# Patient Record
Sex: FEMALE | Race: BLACK OR AFRICAN AMERICAN | NOT HISPANIC OR LATINO | URBAN - METROPOLITAN AREA
[De-identification: names, ages, dates, MRNs, and addresses within clinical notes are randomized per-mention and may not be internally consistent; named-entity substitution may affect disease eponyms.]

---

## 2021-03-05 ENCOUNTER — INPATIENT (INPATIENT)
Facility: HOSPITAL | Age: 86
LOS: 5 days | Discharge: NURSING, REHAB OR LONG-TERM CARE | End: 2021-03-11
Attending: FAMILY MEDICINE | Admitting: FAMILY MEDICINE
Payer: MEDICARE

## 2021-03-05 VITALS
TEMPERATURE: 98 F | HEIGHT: 67 IN | RESPIRATION RATE: 15 BRPM | HEART RATE: 106 BPM | SYSTOLIC BLOOD PRESSURE: 155 MMHG | OXYGEN SATURATION: 98 % | DIASTOLIC BLOOD PRESSURE: 95 MMHG | WEIGHT: 199.96 LBS

## 2021-03-05 LAB
ALBUMIN SERPL ELPH-MCNC: 2.6 G/DL — LOW (ref 3.3–5)
ALP SERPL-CCNC: 70 U/L — SIGNIFICANT CHANGE UP (ref 40–120)
ALT FLD-CCNC: 22 U/L — SIGNIFICANT CHANGE UP (ref 12–78)
ANION GAP SERPL CALC-SCNC: 8 MMOL/L — SIGNIFICANT CHANGE UP (ref 5–17)
ANISOCYTOSIS BLD QL: SLIGHT — SIGNIFICANT CHANGE UP
APTT BLD: 30.2 SEC — SIGNIFICANT CHANGE UP (ref 27.5–35.5)
AST SERPL-CCNC: 29 U/L — SIGNIFICANT CHANGE UP (ref 15–37)
BASOPHILS # BLD AUTO: 0.01 K/UL — SIGNIFICANT CHANGE UP (ref 0–0.2)
BASOPHILS NFR BLD AUTO: 0.1 % — SIGNIFICANT CHANGE UP (ref 0–2)
BILIRUB DIRECT SERPL-MCNC: 0.94 MG/DL — HIGH (ref 0.05–0.2)
BILIRUB SERPL-MCNC: 1.3 MG/DL — HIGH (ref 0.2–1.2)
BUN SERPL-MCNC: 30 MG/DL — HIGH (ref 7–23)
CALCIUM SERPL-MCNC: 8.9 MG/DL — SIGNIFICANT CHANGE UP (ref 8.5–10.1)
CHLORIDE SERPL-SCNC: 102 MMOL/L — SIGNIFICANT CHANGE UP (ref 96–108)
CO2 SERPL-SCNC: 30 MMOL/L — SIGNIFICANT CHANGE UP (ref 22–31)
CREAT SERPL-MCNC: 1.03 MG/DL — SIGNIFICANT CHANGE UP (ref 0.5–1.3)
D DIMER BLD IA.RAPID-MCNC: 1877 NG/ML DDU — HIGH
EOSINOPHIL # BLD AUTO: 0 K/UL — SIGNIFICANT CHANGE UP (ref 0–0.5)
EOSINOPHIL NFR BLD AUTO: 0 % — SIGNIFICANT CHANGE UP (ref 0–6)
FLUAV AG NPH QL: SIGNIFICANT CHANGE UP
FLUBV AG NPH QL: SIGNIFICANT CHANGE UP
GLUCOSE BLDC GLUCOMTR-MCNC: 138 MG/DL — HIGH (ref 70–99)
GLUCOSE SERPL-MCNC: 160 MG/DL — HIGH (ref 70–99)
HCT VFR BLD CALC: 42.8 % — SIGNIFICANT CHANGE UP (ref 34.5–45)
HGB BLD-MCNC: 13.9 G/DL — SIGNIFICANT CHANGE UP (ref 11.5–15.5)
IMM GRANULOCYTES NFR BLD AUTO: 1.3 % — SIGNIFICANT CHANGE UP (ref 0–1.5)
INR BLD: 1.25 RATIO — HIGH (ref 0.88–1.16)
LACTATE SERPL-SCNC: 1.9 MMOL/L — SIGNIFICANT CHANGE UP (ref 0.7–2)
LYMPHOCYTES # BLD AUTO: 0.67 K/UL — LOW (ref 1–3.3)
LYMPHOCYTES # BLD AUTO: 8.6 % — LOW (ref 13–44)
MANUAL SMEAR VERIFICATION: YES — SIGNIFICANT CHANGE UP
MCHC RBC-ENTMCNC: 28.1 PG — SIGNIFICANT CHANGE UP (ref 27–34)
MCHC RBC-ENTMCNC: 32.5 GM/DL — SIGNIFICANT CHANGE UP (ref 32–36)
MCV RBC AUTO: 86.5 FL — SIGNIFICANT CHANGE UP (ref 80–100)
MICROCYTES BLD QL: SLIGHT — SIGNIFICANT CHANGE UP
MONOCYTES # BLD AUTO: 1.77 K/UL — HIGH (ref 0–0.9)
MONOCYTES NFR BLD AUTO: 22.7 % — HIGH (ref 2–14)
NEUTROPHILS # BLD AUTO: 5.25 K/UL — SIGNIFICANT CHANGE UP (ref 1.8–7.4)
NEUTROPHILS NFR BLD AUTO: 67.3 % — SIGNIFICANT CHANGE UP (ref 43–77)
NRBC # BLD: 0 /100 WBCS — SIGNIFICANT CHANGE UP (ref 0–0)
OVALOCYTES BLD QL SMEAR: SLIGHT — SIGNIFICANT CHANGE UP
PLAT MORPH BLD: NORMAL — SIGNIFICANT CHANGE UP
PLATELET # BLD AUTO: 292 K/UL — SIGNIFICANT CHANGE UP (ref 150–400)
POIKILOCYTOSIS BLD QL AUTO: SLIGHT — SIGNIFICANT CHANGE UP
POTASSIUM SERPL-MCNC: 3.4 MMOL/L — LOW (ref 3.5–5.3)
POTASSIUM SERPL-SCNC: 3.4 MMOL/L — LOW (ref 3.5–5.3)
PROT SERPL-MCNC: 7.6 GM/DL — SIGNIFICANT CHANGE UP (ref 6–8.3)
PROTHROM AB SERPL-ACNC: 14.3 SEC — HIGH (ref 10.6–13.6)
RBC # BLD: 4.95 M/UL — SIGNIFICANT CHANGE UP (ref 3.8–5.2)
RBC # FLD: 16.8 % — HIGH (ref 10.3–14.5)
RBC BLD AUTO: ABNORMAL
SARS-COV-2 RNA SPEC QL NAA+PROBE: SIGNIFICANT CHANGE UP
SODIUM SERPL-SCNC: 140 MMOL/L — SIGNIFICANT CHANGE UP (ref 135–145)
TROPONIN I SERPL-MCNC: 0.05 NG/ML — HIGH (ref 0.01–0.04)
WBC # BLD: 7.8 K/UL — SIGNIFICANT CHANGE UP (ref 3.8–10.5)
WBC # FLD AUTO: 7.8 K/UL — SIGNIFICANT CHANGE UP (ref 3.8–10.5)

## 2021-03-05 PROCEDURE — 71275 CT ANGIOGRAPHY CHEST: CPT | Mod: 26,MH

## 2021-03-05 PROCEDURE — 93971 EXTREMITY STUDY: CPT | Mod: 26,RT

## 2021-03-05 PROCEDURE — 72125 CT NECK SPINE W/O DYE: CPT | Mod: 26,MH

## 2021-03-05 PROCEDURE — 71045 X-RAY EXAM CHEST 1 VIEW: CPT | Mod: 26

## 2021-03-05 PROCEDURE — 73060 X-RAY EXAM OF HUMERUS: CPT | Mod: 26,LT

## 2021-03-05 PROCEDURE — 70450 CT HEAD/BRAIN W/O DYE: CPT | Mod: 26,MH

## 2021-03-05 PROCEDURE — 93010 ELECTROCARDIOGRAM REPORT: CPT

## 2021-03-05 PROCEDURE — 73030 X-RAY EXAM OF SHOULDER: CPT | Mod: 26,LT

## 2021-03-05 PROCEDURE — 72170 X-RAY EXAM OF PELVIS: CPT | Mod: 26

## 2021-03-05 PROCEDURE — 73090 X-RAY EXAM OF FOREARM: CPT | Mod: 26,LT

## 2021-03-05 PROCEDURE — 99285 EMERGENCY DEPT VISIT HI MDM: CPT | Mod: CS

## 2021-03-05 RX ORDER — SODIUM CHLORIDE 9 MG/ML
1000 INJECTION INTRAMUSCULAR; INTRAVENOUS; SUBCUTANEOUS ONCE
Refills: 0 | Status: DISCONTINUED | OUTPATIENT
Start: 2021-03-05 | End: 2021-03-06

## 2021-03-05 RX ORDER — ASPIRIN/CALCIUM CARB/MAGNESIUM 324 MG
162 TABLET ORAL ONCE
Refills: 0 | Status: COMPLETED | OUTPATIENT
Start: 2021-03-05 | End: 2021-03-05

## 2021-03-05 RX ORDER — SODIUM CHLORIDE 9 MG/ML
500 INJECTION INTRAMUSCULAR; INTRAVENOUS; SUBCUTANEOUS ONCE
Refills: 0 | Status: COMPLETED | OUTPATIENT
Start: 2021-03-05 | End: 2021-03-05

## 2021-03-05 RX ADMIN — SODIUM CHLORIDE 500 MILLILITER(S): 9 INJECTION INTRAMUSCULAR; INTRAVENOUS; SUBCUTANEOUS at 23:51

## 2021-03-05 NOTE — ED PROVIDER NOTE - OBJECTIVE STATEMENT
89yo F hx dementia (a&ox 2 at baseline), htn, brought in by family for generalized weakness, and confusion over the past 3 days. History obtained by daughter. PAtient lives alone, family takes turns taking care of her. Reportedly slipped out of chair 3 days ago, did not hit head, no LOC. Complains of L arm pain. Has not been ambulating x for several days. Walks with cane/walker at baseline.

## 2021-03-05 NOTE — ED PROVIDER NOTE - CLINICAL SUMMARY MEDICAL DECISION MAKING FREE TEXT BOX
87 yo F presenting with generalized weakness and delirium x 3 days. r/o metabolic vs infectious cause. r/o PE, pt with RLE edema, tachy. r/o fx RUE. tba

## 2021-03-05 NOTE — ED PROVIDER NOTE - PHYSICAL EXAMINATION
VITALS: reviewed  GEN: NAD, A & O x 1  HEAD/EYES: NCAT, EOMI, anicteric sclerae  ENT: mucus membranes moist, oropharynx WNL, trachea midline  RESP: lungs CTA with equal breath sounds bilaterally, chest wall nontender and atraumatic  CV: heart with reg rhythm S1, S2, distal pulses intact and symmetric bilaterally  ABDOMEN: normoactive bowel sounds, soft, nondistended, nontender, no palpable masses  : no CVAT  MSK: tenderness with ROM LUE. extremities atraumatic and nontender, no edema, no asymmetry. the back is without midline or lateral tenderness, there is no spinal deformity or stepoff and the back is ranged painlessly. the neck has no midline tenderness, deformity, or stepoff, and is ranged painlessly.  SKIN: warm, dry, no rash, no bruising, no cyanosis. color appropriate for ethnicity  NEURO: confused, no facial asymmetry. follows commands. strength UE 5/5, sensation intact, b/l LE weakness.    PSYCH: Affect appropriate

## 2021-03-05 NOTE — ED ADULT NURSE NOTE - OBJECTIVE STATEMENT
Pt axox2 presents to the ED complaining of weakness. Pt axox2 presents to the ED complaining of weakness and confusion for the past few days. Per family, pt fell out of chair but denies head trauma. Pt has pain in lefft arm. No redness or bruxing noted. Pt usually walks with cane/walker at home.

## 2021-03-06 DIAGNOSIS — R41.82 ALTERED MENTAL STATUS, UNSPECIFIED: ICD-10-CM

## 2021-03-06 DIAGNOSIS — I10 ESSENTIAL (PRIMARY) HYPERTENSION: ICD-10-CM

## 2021-03-06 DIAGNOSIS — R15.9 FULL INCONTINENCE OF FECES: ICD-10-CM

## 2021-03-06 DIAGNOSIS — R13.10 DYSPHAGIA, UNSPECIFIED: ICD-10-CM

## 2021-03-06 DIAGNOSIS — F03.90 UNSPECIFIED DEMENTIA WITHOUT BEHAVIORAL DISTURBANCE: ICD-10-CM

## 2021-03-06 DIAGNOSIS — M25.512 PAIN IN LEFT SHOULDER: ICD-10-CM

## 2021-03-06 DIAGNOSIS — I48.91 UNSPECIFIED ATRIAL FIBRILLATION: ICD-10-CM

## 2021-03-06 DIAGNOSIS — R77.8 OTHER SPECIFIED ABNORMALITIES OF PLASMA PROTEINS: ICD-10-CM

## 2021-03-06 DIAGNOSIS — Z78.9 OTHER SPECIFIED HEALTH STATUS: Chronic | ICD-10-CM

## 2021-03-06 DIAGNOSIS — E87.6 HYPOKALEMIA: ICD-10-CM

## 2021-03-06 DIAGNOSIS — Z29.9 ENCOUNTER FOR PROPHYLACTIC MEASURES, UNSPECIFIED: ICD-10-CM

## 2021-03-06 LAB
ALBUMIN SERPL ELPH-MCNC: 2.2 G/DL — LOW (ref 3.3–5)
ALP SERPL-CCNC: 65 U/L — SIGNIFICANT CHANGE UP (ref 40–120)
ALT FLD-CCNC: 22 U/L — SIGNIFICANT CHANGE UP (ref 12–78)
ANION GAP SERPL CALC-SCNC: 13 MMOL/L — SIGNIFICANT CHANGE UP (ref 5–17)
APPEARANCE UR: ABNORMAL
AST SERPL-CCNC: 41 U/L — HIGH (ref 15–37)
BACTERIA # UR AUTO: ABNORMAL
BASOPHILS # BLD AUTO: 0.03 K/UL — SIGNIFICANT CHANGE UP (ref 0–0.2)
BASOPHILS NFR BLD AUTO: 0.3 % — SIGNIFICANT CHANGE UP (ref 0–2)
BILIRUB SERPL-MCNC: 1.1 MG/DL — SIGNIFICANT CHANGE UP (ref 0.2–1.2)
BILIRUB UR-MCNC: ABNORMAL
BUN SERPL-MCNC: 32 MG/DL — HIGH (ref 7–23)
CALCIUM SERPL-MCNC: 8.6 MG/DL — SIGNIFICANT CHANGE UP (ref 8.5–10.1)
CHLORIDE SERPL-SCNC: 109 MMOL/L — HIGH (ref 96–108)
CO2 SERPL-SCNC: 21 MMOL/L — LOW (ref 22–31)
COLOR SPEC: ABNORMAL
CREAT SERPL-MCNC: 0.95 MG/DL — SIGNIFICANT CHANGE UP (ref 0.5–1.3)
DIFF PNL FLD: ABNORMAL
EOSINOPHIL # BLD AUTO: 0 K/UL — SIGNIFICANT CHANGE UP (ref 0–0.5)
EOSINOPHIL NFR BLD AUTO: 0 % — SIGNIFICANT CHANGE UP (ref 0–6)
EPI CELLS # UR: SIGNIFICANT CHANGE UP
GLUCOSE SERPL-MCNC: 111 MG/DL — HIGH (ref 70–99)
GLUCOSE UR QL: NEGATIVE MG/DL — SIGNIFICANT CHANGE UP
GRAN CASTS # UR COMP ASSIST: ABNORMAL /LPF
HCT VFR BLD CALC: 43.4 % — SIGNIFICANT CHANGE UP (ref 34.5–45)
HGB BLD-MCNC: 13.6 G/DL — SIGNIFICANT CHANGE UP (ref 11.5–15.5)
IMM GRANULOCYTES NFR BLD AUTO: 1 % — SIGNIFICANT CHANGE UP (ref 0–1.5)
KETONES UR-MCNC: ABNORMAL
LEUKOCYTE ESTERASE UR-ACNC: ABNORMAL
LYMPHOCYTES # BLD AUTO: 1.17 K/UL — SIGNIFICANT CHANGE UP (ref 1–3.3)
LYMPHOCYTES # BLD AUTO: 13.1 % — SIGNIFICANT CHANGE UP (ref 13–44)
MAGNESIUM SERPL-MCNC: 1.8 MG/DL — SIGNIFICANT CHANGE UP (ref 1.6–2.6)
MCHC RBC-ENTMCNC: 27.8 PG — SIGNIFICANT CHANGE UP (ref 27–34)
MCHC RBC-ENTMCNC: 31.3 GM/DL — LOW (ref 32–36)
MCV RBC AUTO: 88.8 FL — SIGNIFICANT CHANGE UP (ref 80–100)
MONOCYTES # BLD AUTO: 2.31 K/UL — HIGH (ref 0–0.9)
MONOCYTES NFR BLD AUTO: 25.8 % — HIGH (ref 2–14)
NEUTROPHILS # BLD AUTO: 5.34 K/UL — SIGNIFICANT CHANGE UP (ref 1.8–7.4)
NEUTROPHILS NFR BLD AUTO: 59.8 % — SIGNIFICANT CHANGE UP (ref 43–77)
NITRITE UR-MCNC: NEGATIVE — SIGNIFICANT CHANGE UP
NRBC # BLD: 0 /100 WBCS — SIGNIFICANT CHANGE UP (ref 0–0)
NT-PROBNP SERPL-SCNC: 1020 PG/ML — HIGH (ref 0–450)
PH UR: 6.5 — SIGNIFICANT CHANGE UP (ref 5–8)
PHOSPHATE SERPL-MCNC: 3.4 MG/DL — SIGNIFICANT CHANGE UP (ref 2.5–4.5)
PLATELET # BLD AUTO: 246 K/UL — SIGNIFICANT CHANGE UP (ref 150–400)
POTASSIUM SERPL-MCNC: 4.1 MMOL/L — SIGNIFICANT CHANGE UP (ref 3.5–5.3)
POTASSIUM SERPL-SCNC: 4.1 MMOL/L — SIGNIFICANT CHANGE UP (ref 3.5–5.3)
PROT SERPL-MCNC: 7.3 GM/DL — SIGNIFICANT CHANGE UP (ref 6–8.3)
PROT UR-MCNC: 100 MG/DL
RBC # BLD: 4.89 M/UL — SIGNIFICANT CHANGE UP (ref 3.8–5.2)
RBC # FLD: 17.1 % — HIGH (ref 10.3–14.5)
RBC CASTS # UR COMP ASSIST: ABNORMAL /HPF (ref 0–4)
SODIUM SERPL-SCNC: 143 MMOL/L — SIGNIFICANT CHANGE UP (ref 135–145)
SP GR SPEC: 1.01 — SIGNIFICANT CHANGE UP (ref 1.01–1.02)
TROPONIN I SERPL-MCNC: 0.04 NG/ML — SIGNIFICANT CHANGE UP (ref 0.01–0.04)
TSH SERPL-MCNC: 4.96 UU/ML — HIGH (ref 0.36–3.74)
UROBILINOGEN FLD QL: 12 MG/DL
WBC # BLD: 8.94 K/UL — SIGNIFICANT CHANGE UP (ref 3.8–10.5)
WBC # FLD AUTO: 8.94 K/UL — SIGNIFICANT CHANGE UP (ref 3.8–10.5)
WBC UR QL: SIGNIFICANT CHANGE UP

## 2021-03-06 PROCEDURE — 99223 1ST HOSP IP/OBS HIGH 75: CPT

## 2021-03-06 PROCEDURE — 72131 CT LUMBAR SPINE W/O DYE: CPT | Mod: 26

## 2021-03-06 RX ORDER — APIXABAN 2.5 MG/1
5 TABLET, FILM COATED ORAL
Refills: 0 | Status: DISCONTINUED | OUTPATIENT
Start: 2021-03-06 | End: 2021-03-06

## 2021-03-06 RX ORDER — TRAMADOL HYDROCHLORIDE 50 MG/1
50 TABLET ORAL EVERY 6 HOURS
Refills: 0 | Status: DISCONTINUED | OUTPATIENT
Start: 2021-03-06 | End: 2021-03-11

## 2021-03-06 RX ORDER — ONDANSETRON 8 MG/1
4 TABLET, FILM COATED ORAL EVERY 6 HOURS
Refills: 0 | Status: DISCONTINUED | OUTPATIENT
Start: 2021-03-06 | End: 2021-03-11

## 2021-03-06 RX ORDER — DONEPEZIL HYDROCHLORIDE 10 MG/1
10 TABLET, FILM COATED ORAL AT BEDTIME
Refills: 0 | Status: DISCONTINUED | OUTPATIENT
Start: 2021-03-06 | End: 2021-03-08

## 2021-03-06 RX ORDER — ENOXAPARIN SODIUM 100 MG/ML
60 INJECTION SUBCUTANEOUS
Refills: 0 | Status: DISCONTINUED | OUTPATIENT
Start: 2021-03-06 | End: 2021-03-09

## 2021-03-06 RX ORDER — SODIUM CHLORIDE 9 MG/ML
1000 INJECTION INTRAMUSCULAR; INTRAVENOUS; SUBCUTANEOUS
Refills: 0 | Status: DISCONTINUED | OUTPATIENT
Start: 2021-03-06 | End: 2021-03-08

## 2021-03-06 RX ORDER — SODIUM CHLORIDE 9 MG/ML
1000 INJECTION INTRAMUSCULAR; INTRAVENOUS; SUBCUTANEOUS
Refills: 0 | Status: DISCONTINUED | OUTPATIENT
Start: 2021-03-06 | End: 2021-03-06

## 2021-03-06 RX ORDER — KETOROLAC TROMETHAMINE 30 MG/ML
15 SYRINGE (ML) INJECTION EVERY 8 HOURS
Refills: 0 | Status: DISCONTINUED | OUTPATIENT
Start: 2021-03-06 | End: 2021-03-11

## 2021-03-06 RX ORDER — LIDOCAINE 4 G/100G
1 CREAM TOPICAL EVERY 24 HOURS
Refills: 0 | Status: DISCONTINUED | OUTPATIENT
Start: 2021-03-06 | End: 2021-03-11

## 2021-03-06 RX ORDER — ACETAMINOPHEN 500 MG
650 TABLET ORAL EVERY 6 HOURS
Refills: 0 | Status: DISCONTINUED | OUTPATIENT
Start: 2021-03-06 | End: 2021-03-11

## 2021-03-06 RX ORDER — LOSARTAN POTASSIUM 100 MG/1
100 TABLET, FILM COATED ORAL DAILY
Refills: 0 | Status: DISCONTINUED | OUTPATIENT
Start: 2021-03-06 | End: 2021-03-11

## 2021-03-06 RX ORDER — METOPROLOL TARTRATE 50 MG
12.5 TABLET ORAL
Refills: 0 | Status: DISCONTINUED | OUTPATIENT
Start: 2021-03-06 | End: 2021-03-08

## 2021-03-06 RX ORDER — ENOXAPARIN SODIUM 100 MG/ML
65 INJECTION SUBCUTANEOUS
Refills: 0 | Status: DISCONTINUED | OUTPATIENT
Start: 2021-03-06 | End: 2021-03-06

## 2021-03-06 RX ORDER — POTASSIUM CHLORIDE 20 MEQ
10 PACKET (EA) ORAL ONCE
Refills: 0 | Status: COMPLETED | OUTPATIENT
Start: 2021-03-06 | End: 2021-03-06

## 2021-03-06 RX ADMIN — ENOXAPARIN SODIUM 60 MILLIGRAM(S): 100 INJECTION SUBCUTANEOUS at 17:10

## 2021-03-06 RX ADMIN — TRAMADOL HYDROCHLORIDE 50 MILLIGRAM(S): 50 TABLET ORAL at 23:26

## 2021-03-06 RX ADMIN — SODIUM CHLORIDE 75 MILLILITER(S): 9 INJECTION INTRAMUSCULAR; INTRAVENOUS; SUBCUTANEOUS at 05:07

## 2021-03-06 RX ADMIN — LIDOCAINE 1 PATCH: 4 CREAM TOPICAL at 05:43

## 2021-03-06 RX ADMIN — LIDOCAINE 1 PATCH: 4 CREAM TOPICAL at 18:03

## 2021-03-06 RX ADMIN — Medication 12.5 MILLIGRAM(S): at 18:43

## 2021-03-06 RX ADMIN — Medication 15 MILLIGRAM(S): at 05:40

## 2021-03-06 RX ADMIN — SODIUM CHLORIDE 75 MILLILITER(S): 9 INJECTION INTRAMUSCULAR; INTRAVENOUS; SUBCUTANEOUS at 17:09

## 2021-03-06 RX ADMIN — ONDANSETRON 4 MILLIGRAM(S): 8 TABLET, FILM COATED ORAL at 02:27

## 2021-03-06 RX ADMIN — Medication 15 MILLIGRAM(S): at 02:27

## 2021-03-06 RX ADMIN — LIDOCAINE 1 PATCH: 4 CREAM TOPICAL at 07:42

## 2021-03-06 RX ADMIN — Medication 100 MILLIEQUIVALENT(S): at 05:07

## 2021-03-06 RX ADMIN — DONEPEZIL HYDROCHLORIDE 10 MILLIGRAM(S): 10 TABLET, FILM COATED ORAL at 21:10

## 2021-03-06 NOTE — H&P ADULT - PROBLEM SELECTOR PLAN 10
- on Reta Ibarra w/ daughter Kim whom pt lives w/ and is also HCP, pt DNR/DNI. Also spoke w/ Ms Farley also daughter who lives in [place]

## 2021-03-06 NOTE — H&P ADULT - PROBLEM SELECTOR PLAN 6
- also of urine, new per daughter, unsure if due to weakness  - Neuro exam limited  - f/u CT L spine

## 2021-03-06 NOTE — H&P ADULT - PROBLEM SELECTOR PLAN 9
- on Eliquis - on Reta Ibarra w/ daughter Kim whom pt lives w/ and is also HCP, pt DNR/DNI. Also spoke w/ Ms Farley also daughter who lives in [place] - c/w [place]--> Losartan

## 2021-03-06 NOTE — CONSULT NOTE ADULT - SUBJECTIVE AND OBJECTIVE BOX
Patient is a 88yFemale demented who presents to [facility] ED w/ a c/o of L Shoulder pain s/p MF 4 days ago. Unable to obtain any significant hx from pt 2/2 baseline mental status. Per ED pt lives with daughter who reports she had a MF 4 days ago and is now complaining of L shoulder pain. Denies HS/LOC. Localizing pain to left shoulder, denies radiation of pain elsewhere. Denies any numbness or tingling. Denies having any other pain elsewhere. Denies fevers/chills. Afebrile in ED. Ambulates w/ assistive devices at baseline & was able to ambulate after the injury. No other orthopaedic concerns at this time.    PAST MEDICAL & SURGICAL HISTORY:  Atrial fibrillation  Dementia  HTN (hypertension)  Surgical history unknown    No Known Allergies    PHYSICAL EXAM:  T(C): 36.4 (03-05-21 @ 20:57), Max: 36.4 (03-05-21 @ 20:57)  HR: 96 (03-06-21 @ 02:32) (84 - 106)  BP: 155/94 (03-06-21 @ 02:32) (155/94 - 157/100)  RR: 26 (03-06-21 @ 02:32) (15 - 26)  SpO2: 93% (03-06-21 @ 02:32) (93% - 98%)    Gen: NAD, Resting comfortably, a/ox0, not following commands, responds verbal/noxious stimuli  Left Upper Extremity:   Skin intact, + soft tissue swelling/mass anterior deltoid, no palpable effusion, no erythema, ecchymosis, edema, or gross deformity.  NTTP over the bony prominences of the shoulder/elbow/wrist/hand/fingers  Painless passive ROM of the shoulder/elbow/wrist/hand/fingers  Unable to assess motor/sensory exam 2/2 baseline mental status  + radial pulse  Compartments soft and compressible    Secondary Survey:   Negative log roll/heelstrike BL. No TTP over bony prominences, SILT, palpable pulses, full/painless PROM, compartments soft. No TTP over spinous processes or paraspinal muscles at C/T/L spine. No palpable step off. No other injuries or complaints.      Imaging:  CT Chest demonstrates BL glenohumeral joint congruency, + GH arthritic changes BL, + Soft tissue swelling L Anterior deltoid, no acute fx/dsx appreciated  XR L Shldr/Humerus/Forearm demonstrates no obvious fx/dsxn, elbow eval limited 2/2 poor quality films however +Elbow arthritic changes & no obvious fx/dsx             Patient is a 88yFemale demented who presents to [facility] ED w/ a c/o of L Shoulder pain s/p MF 4 days ago. Unable to obtain any significant hx from pt 2/2 baseline mental status. Per ED pt lives with daughter who reports she had a MF 4 days ago and is now complaining of L shoulder pain. Denies HS/LOC. Localizing pain to left shoulder, denies radiation of pain elsewhere. Denies any numbness or tingling. Denies having any other pain elsewhere. Denies fevers/chills. Afebrile in ED. Ambulates w/ assistive devices at baseline & was able to ambulate after the injury. No other orthopaedic concerns at this time.    PAST MEDICAL & SURGICAL HISTORY:  Atrial fibrillation  Dementia  HTN (hypertension)  Surgical history unknown    No Known Allergies    PHYSICAL EXAM:  T(C): 36.4 (03-05-21 @ 20:57), Max: 36.4 (03-05-21 @ 20:57)  HR: 96 (03-06-21 @ 02:32) (84 - 106)  BP: 155/94 (03-06-21 @ 02:32) (155/94 - 157/100)  RR: 26 (03-06-21 @ 02:32) (15 - 26)  SpO2: 93% (03-06-21 @ 02:32) (93% - 98%)    Gen: NAD, Resting comfortably, a/ox0, not following commands, responds verbal/noxious stimuli, groans/moans intermittently during exam however not reproducible with any specific extremity/movement/palpation  Left Upper Extremity:   Skin intact, + soft tissue swelling/mass anterior deltoid, no palpable effusion, no erythema, ecchymosis, edema, or gross deformity.  NTTP over the bony prominences of the shoulder/elbow/wrist/hand/fingers  Painless passive ROM of the shoulder/elbow/wrist/hand/fingers  Unable to assess motor/sensory exam 2/2 baseline mental status  + radial pulse  Compartments soft and compressible    Secondary Survey:   Negative log roll/heelstrike BL. No TTP over bony prominences, SILT, palpable pulses, full/painless PROM, compartments soft. No TTP over spinous processes or paraspinal muscles at C/T/L spine. No palpable step off. No other injuries or complaints.      Imaging:  CT Chest demonstrates BL glenohumeral joint congruency, + GH arthritic changes BL, + Soft tissue swelling L Anterior deltoid, no acute fx/dsx appreciated  XR L Shldr/Humerus/Forearm demonstrates no obvious fx/dsxn, elbow eval limited 2/2 poor quality films however +Elbow arthritic changes & no obvious fx/dsx             Patient is a 88yFemale demented who presents to [facility] ED w/ a c/o of L Shoulder pain s/p MF 4 days ago. Unable to obtain any significant hx from pt 2/2 baseline mental status. Per ED pt lives with daughter who reports she had a MF 4 days ago and is now complaining of L shoulder pain. Denies HS/LOC. Localizing pain to left shoulder, denies radiation of pain elsewhere. Denies any numbness or tingling. Denies having any other pain elsewhere. Denies fevers/chills. Afebrile in ED. Ambulates w/ assistive devices at baseline & was able to ambulate after the injury. No other orthopaedic concerns at this time.    PAST MEDICAL & SURGICAL HISTORY:  Atrial fibrillation  Dementia  HTN (hypertension)  Surgical history unknown    No Known Allergies    PHYSICAL EXAM:  T(C): 36.4 (03-05-21 @ 20:57), Max: 36.4 (03-05-21 @ 20:57)  HR: 96 (03-06-21 @ 02:32) (84 - 106)  BP: 155/94 (03-06-21 @ 02:32) (155/94 - 157/100)  RR: 26 (03-06-21 @ 02:32) (15 - 26)  SpO2: 93% (03-06-21 @ 02:32) (93% - 98%)    Gen: NAD, Resting comfortably, a/ox0, not following commands, responds verbal/noxious stimuli, groans/moans intermittently during exam however not reproducible with any specific extremity/movement/palpation  Left Upper Extremity:   Skin intact, + soft tissue swelling/mass anterior deltoid, no palpable joint effusion, no erythema, ecchymosis, edema, or gross deformity.  NTTP over the bony prominences of the shoulder/elbow/wrist/hand/fingers  Painless passive ROM of the shoulder/elbow/wrist/hand/fingers  Unable to assess motor/sensory exam 2/2 baseline mental status  + radial pulse  Compartments soft and compressible    Secondary Survey:   Negative log roll/heelstrike BL. No TTP over bony prominences, SILT, palpable pulses, full/painless PROM, compartments soft. No TTP over spinous processes or paraspinal muscles at C/T/L spine. No palpable step off. No other injuries or complaints.      Imaging:  CT Chest demonstrates BL glenohumeral joint congruency, + GH arthritic changes BL, + Soft tissue swelling L Anterior deltoid, no acute fx/dsx appreciated  XR L Shldr/Humerus/Forearm demonstrates no obvious fx/dsxn, elbow eval limited 2/2 poor quality films however +Elbow arthritic changes & no obvious fx/dsx

## 2021-03-06 NOTE — H&P ADULT - PROBLEM SELECTOR PLAN 3
- ? dislocation  - per Ortho does not appear dislocated on Xray, possible old hematoma, /fu official consult  - lidoderm patch for pain - ? dislocation  - per Ortho does not appear dislocated on Xray, possible old hematoma, f/u official consult  - lidoderm patch for pain

## 2021-03-06 NOTE — H&P ADULT - NSHPPHYSICALEXAM_GEN_ALL_CORE
PHYSICAL EXAM:    Vital Signs Last 24 Hrs  T(C): 36.4 (05 Mar 2021 20:57), Max: 36.4 (05 Mar 2021 20:57)  T(F): 97.5 (05 Mar 2021 20:57), Max: 97.5 (05 Mar 2021 20:57)  HR: 84 (06 Mar 2021 00:16) (84 - 106)  BP: 157/100 (06 Mar 2021 00:16) (155/95 - 157/100)  BP(mean): --  RR: 24 (06 Mar 2021 00:16) (15 - 24)  SpO2: 98% (06 Mar 2021 00:16) (98% - 98%)    GENERAL: Pt lying in bed comfortably in NAD  HEENT:  Atraumatic, EOMI, PERRL, conjunctiva and sclera clear, MMM  NECK: Supple, No JVD  CHEST/LUNG: Clear to auscultation bilaterally; No rales, rhonchi, wheezing or rubs. Unlabored respirations  HEART: Regular rate and rhythm; No murmurs, rubs, or gallops  ABDOMEN: Bowel sounds present; Soft, Nontender, Nondistended. No guarding or rigidity    EXTREMITIES:  2+ Peripheral Pulses, brisk capillary refill. No clubbing, cyanosis, or edema  NEUROLOGICAL:  Alert & Oriented X3, speech clear. Answers questions appropriately. Full and equal strength B/L upper and lower extremities. No deficits   MSK: FROM x 4 extremities   SKIN: No rashes or lesions PHYSICAL EXAM:    Vital Signs Last 24 Hrs  T(C): 36.4 (05 Mar 2021 20:57), Max: 36.4 (05 Mar 2021 20:57)  T(F): 97.5 (05 Mar 2021 20:57), Max: 97.5 (05 Mar 2021 20:57)  HR: 84 (06 Mar 2021 00:16) (84 - 106)  BP: 157/100 (06 Mar 2021 00:16) (155/95 - 157/100)  BP(mean): --  RR: 24 (06 Mar 2021 00:16) (15 - 24)  SpO2: 98% (06 Mar 2021 00:16) (98% - 98%)    GENERAL: Pt lying in bed comfortably in NAD  HEENT:  Atraumatic, PERRL DRY MM, poor dentition  NECK: Supple,  CHEST/LUNG: Clear to auscultation bilaterally; No rales, rhonchi, wheezing or rubs. Unlabored respirations  HEART: Irregular rate and rhythm  ABDOMEN: Bowel sounds present; Soft, Nontender, Nondistended    EXTREMITIES:  2+ Peripheral Pulses, brisk capillary refill. +b/l LE edema, left shoulder bulge ? dislocation  NEUROLOGICAL:  lethargic, moans, withdraws to pain  SKIN: No rashes or lesions

## 2021-03-06 NOTE — CONSULT NOTE ADULT - ASSESSMENT
A/P:  Patient is a 88y Female w/ L Shoulder OA, no fractures/dislocations    -ED Admitting to medicine for AMS  -Medical management per primary team  -Multimodal Analgesia prn  -WBAT LUE  -DVT ppx per primary team  -PT/OT  -Ice and elevate as tolerated  -No acute orthopaedic surgical intervention indicated at this time  -Orthopaedically stable  -Recommend follow up w/ Dr. Fagan outpatient in 1-2 weeks. Call office to schedule appointment.  -All Patient's and Family Member's questions answered. Patient/family understand and agree w/ plan.  -Will discuss w/ attending and advise if plan changes.    Angel Barros M.D.   Orthopaedic Surgery A/P:  Patient is a 88y Female w/ L Shoulder chronic OA, swelling/mass about the anterior deltoid may represent soft tissue contusion/swelling vs hematoma in setting of recent trauma. No Acute fractures/dislocations appreciated. Pt afebrile, WBC WNL, low clinical suspicion for septic arthritis at this time.     -ED Admitting to medicine for AMS  -Medical management per primary team  -Multimodal Analgesia prn  -WBAT LUE  -DVT ppx per primary team  -PT/OT  -Ice and elevate as tolerated  -No acute orthopaedic surgical intervention indicated at this time  -Orthopaedically stable  -Recommend follow up w/ Dr. Fagan outpatient in 1-2 weeks. Call office to schedule appointment.  -All Patient's and Family Member's questions answered. Patient/family understand and agree w/ plan.  -Will discuss w/ attending and advise if plan changes.    Angel Barros M.D.   Orthopaedic Surgery

## 2021-03-06 NOTE — H&P ADULT - PROBLEM SELECTOR PLAN 2
- Pt in mild RVR on arrival  - start low dose BB  - start Eliquis  - monitor on tele  - f/u TSH  - 2D Echo

## 2021-03-06 NOTE — H&P ADULT - HISTORY OF PRESENT ILLNESS
89 y/o female w/ PMHx of HTN, recently diagnosed w/ Dementia and Atrial Fibrillation by PCP 3 weeks ago presents to the ED w/ worsening AMS. Pt minimally verbal, moans at times, lethargic, hx obtained from daughter Kim. Daughter reports in the last 3-4 days pt has been "delusional", seeing things that are not there, tangential during conversation, less talkative, staying in bed, +ve urinary and bowel incontinence. Daughter reports 3 days ago pt slid off the chair to her buttock, no head trauma or LOC, no other trauma to other joints. Today daughter noted pt c/o left shoulder and arm pain, daughter denies any trauma to shoulder (pt watched almost ATC between her and her son). Pt also c/o back pain however daughter states that is chronic for years, unchanged from baseline as far as she known.    89 y/o female w/ PMHx of HTN, recently diagnosed w/ Dementia and Atrial Fibrillation by PCP 3 weeks ago presents to the ED w/ worsening AMS. Pt minimally verbal, moans at times, lethargic, hx obtained from daughter Kim. Daughter reports in the last 3-4 days pt has been "delusional", seeing things that are not there, tangential during conversation, less talkative, staying in bed, NO PO intake, +ve urinary and bowel incontinence. Daughter reports 3 days ago pt slid off the chair to her buttock, no head trauma or LOC, no other trauma to other joints. Today daughter noted pt c/o left shoulder and arm pain, daughter denies any trauma to shoulder (pt watched almost ATC between her and her son). Pt also c/o back pain however daughter states that is chronic for years, unchanged from baseline as far as she knows. Per daughter no other complaints; no cp, fever or chills, n/v, abd pain or diarrhea.    In ED initial vitals /95, , rest of vitals stable. Labs sig for Dimer 1877, K 3.4, Trop 0.053, BNP 1020, CT head, C spine, CT A chest LE doppler, neg for acute pathology     87 y/o female w/ PMHx of HTN, recently diagnosed w/ Dementia (started on Aricept, referred to Neuro) and Atrial Fibrillation by PCP 3 weeks ago (not started on any medications, referred to Cardio) presents to the ED w/ worsening AMS. Pt minimally verbal, moans at times, lethargic, hx obtained from daughter Kim. Daughter reports in the last 3-4 days pt has been "delusional", seeing things that are not there, tangential during conversation, less talkative, staying in bed, NO PO intake, +ve urinary and bowel incontinence. Daughter reports 3 days ago pt slid off the chair to her buttock, no head trauma or LOC, no other trauma to other joints. Today daughter noted pt c/o left shoulder and arm pain, daughter denies any trauma to shoulder (pt watched almost ATC between her and her son). Pt also c/o back pain however daughter states that is chronic for years, unchanged from baseline as far as she knows. Per daughter no other complaints; no cp, fever or chills, n/v, abd pain or diarrhea.    In ED initial vitals /95, , rest of vitals stable. Labs sig for Dimer 1877, K 3.4, Trop 0.053, BNP 1020, CT head, C spine, CT A chest LE doppler, neg for acute pathology

## 2021-03-06 NOTE — H&P ADULT - ASSESSMENT
89 y/o female w/ PMHx of HTN, recently diagnosed w/ Dementia and Atrial Fibrillation by PCP 3 weeks ago presents to the ED w/ worsening AMS. Pt being admitted for     IMPROVE VTE Individual Risk Assessment    RISK                                                                Points    [  ] Previous VTE                                                  3    [  ] Thrombophilia                                               2    [  ] Lower limb paralysis                                      2        (unable to hold up >15 seconds)      [  ] Current Cancer                                              2         (within 6 months)    [  ] Immobilization > 24 hrs                                1    [  ] ICU/CCU stay > 24 hours                              1    [  ] Age > 60                                                      1    IMPROVE VTE Score _________    IMPROVE Score 0-1: Low Risk, No VTE prophylaxis required for most patients, encourage ambulation.   IMPROVE Score 2-3: At risk, pharmacologic VTE prophylaxis is indicated for most patients (in the absence of a contraindication)  IMPROVE Score > or = 4: High Risk, pharmacologic VTE prophylaxis is indicated for most patients (in the absence of a contraindication) 87 y/o female w/ PMHx of HTN, recently diagnosed w/ Dementia and Atrial Fibrillation by PCP 3 weeks ago presents to the ED w/ worsening AMS. Pt being admitted for further work up.     IMPROVE VTE Individual Risk Assessment    RISK                                                                Points    [  ] Previous VTE                                                  3    [  ] Thrombophilia                                               2    [  ] Lower limb paralysis                                      2        (unable to hold up >15 seconds)      [  ] Current Cancer                                              2         (within 6 months)    [  ] Immobilization > 24 hrs                                1    [  ] ICU/CCU stay > 24 hours                              1    [  ] Age > 60                                                      1    IMPROVE VTE Score _________    IMPROVE Score 0-1: Low Risk, No VTE prophylaxis required for most patients, encourage ambulation.   IMPROVE Score 2-3: At risk, pharmacologic VTE prophylaxis is indicated for most patients (in the absence of a contraindication)  IMPROVE Score > or = 4: High Risk, pharmacologic VTE prophylaxis is indicated for most patients (in the absence of a contraindication)

## 2021-03-06 NOTE — H&P ADULT - PROBLEM SELECTOR PLAN 1
- unclear etiology  - CT head neg  - CT chest neg  - COVID neg  - UA pending; pt initial straight cath, no urine in bladder, likely due to dehydration ornelas placed, c/w IVF, f/u UA

## 2021-03-06 NOTE — CHART NOTE - NSCHARTNOTEFT_GEN_A_CORE
seen and examined  c/w current treatment, urine output low this am, c/w ivf and monitor    Vital Signs Last 24 Hrs  T(C): 36.4 (06 Mar 2021 03:43), Max: 36.4 (05 Mar 2021 20:57)  T(F): 97.6 (06 Mar 2021 03:43), Max: 97.6 (06 Mar 2021 03:43)  HR: 94 (06 Mar 2021 03:43) (84 - 106)  BP: 126/90 (06 Mar 2021 03:43) (126/90 - 157/100)  BP(mean): --  RR: 20 (06 Mar 2021 03:43) (15 - 26)  SpO2: 94% (06 Mar 2021 03:43) (93% - 98%)                            13.6   8.94  )-----------( 246      ( 06 Mar 2021 08:44 )             43.4     03-06    143  |  109<H>  |  32<H>  ----------------------------<  111<H>  4.1   |  21<L>  |  0.95    Ca    8.6      06 Mar 2021 08:44  Phos  3.4     03-06  Mg     1.8     03-06    TPro  7.3  /  Alb  2.2<L>  /  TBili  1.1  /  DBili  x   /  AST  41<H>  /  ALT  22  /  AlkPhos  65  03-06    PT/INR - ( 05 Mar 2021 21:33 )   PT: 14.3 sec;   INR: 1.25 ratio         PTT - ( 05 Mar 2021 21:33 )  PTT:30.2 sec

## 2021-03-06 NOTE — H&P ADULT - NSHPLABSRESULTS_GEN_ALL_CORE
T(C): 36.4 (03-05-21 @ 20:57), Max: 36.4 (03-05-21 @ 20:57)  HR: 84 (03-06-21 @ 00:16) (84 - 106)  BP: 157/100 (03-06-21 @ 00:16) (155/95 - 157/100)  RR: 24 (03-06-21 @ 00:16) (15 - 24)  SpO2: 98% (03-06-21 @ 00:16) (98% - 98%)                        13.9   7.80  )-----------( 292      ( 05 Mar 2021 21:33 )             42.8     03-05    140  |  102  |  30<H>  ----------------------------<  160<H>  3.4<L>   |  30  |  1.03    Ca    8.9      05 Mar 2021 21:33    TPro  7.6  /  Alb  2.6<L>  /  TBili  1.3<H>  /  DBili  .94<H>  /  AST  29  /  ALT  22  /  AlkPhos  70  03-05    LIVER FUNCTIONS - ( 05 Mar 2021 21:33 )  Alb: 2.6 g/dL / Pro: 7.6 gm/dL / ALK PHOS: 70 U/L / ALT: 22 U/L / AST: 29 U/L / GGT: x           PT/INR - ( 05 Mar 2021 21:33 )   PT: 14.3 sec;   INR: 1.25 ratio         PTT - ( 05 Mar 2021 21:33 )  PTT:30.2 sec        acetaminophen   Tablet .. 650 milliGRAM(s) Oral every 6 hours PRN  ondansetron Injectable 4 milliGRAM(s) IV Push every 6 hours PRN  potassium chloride  10 mEq/100 mL IVPB 10 milliEquivalent(s) IV Intermittent once  sodium chloride 0.9%. 1000 milliLiter(s) IV Continuous <Continuous>  traMADol 50 milliGRAM(s) Oral every 6 hours PRN T(C): 36.4 (03-05-21 @ 20:57), Max: 36.4 (03-05-21 @ 20:57)  HR: 84 (03-06-21 @ 00:16) (84 - 106)  BP: 157/100 (03-06-21 @ 00:16) (155/95 - 157/100)  RR: 24 (03-06-21 @ 00:16) (15 - 24)  SpO2: 98% (03-06-21 @ 00:16) (98% - 98%)                        13.9   7.80  )-----------( 292      ( 05 Mar 2021 21:33 )             42.8     03-05    140  |  102  |  30<H>  ----------------------------<  160<H>  3.4<L>   |  30  |  1.03    Ca    8.9      05 Mar 2021 21:33    TPro  7.6  /  Alb  2.6<L>  /  TBili  1.3<H>  /  DBili  .94<H>  /  AST  29  /  ALT  22  /  AlkPhos  70  03-05    LIVER FUNCTIONS - ( 05 Mar 2021 21:33 )  Alb: 2.6 g/dL / Pro: 7.6 gm/dL / ALK PHOS: 70 U/L / ALT: 22 U/L / AST: 29 U/L / GGT: x           PT/INR - ( 05 Mar 2021 21:33 )   PT: 14.3 sec;   INR: 1.25 ratio         PTT - ( 05 Mar 2021 21:33 )  PTT:30.2 sec      < from: CT Head No Cont (03.05.21 @ 22:49) >    IMPRESSION:  Head CT: No acute intracranial hemorrhage, vasogenic edema, extra-axial collection or hydrocephalus. Mild chronic microvascular change.    Cervical spine CT: No acute cervical spine fracture or evidence of traumatic malalignment. Cervical spondylosis most notable at C4/C5 where it is likely severe canal and foraminal stenosis.    < end of copied text >    < from: CT Angio Chest w/ IV Cont (03.05.21 @ 23:03) >    IMPRESSION:  No pulmonary embolism. No acute intrathoracic finding.      < end of copied text >    EKG - Atrial fib w/ RVR at 109 bpm, nonspecific T wave flattening     acetaminophen   Tablet .. 650 milliGRAM(s) Oral every 6 hours PRN  ondansetron Injectable 4 milliGRAM(s) IV Push every 6 hours PRN  potassium chloride  10 mEq/100 mL IVPB 10 milliEquivalent(s) IV Intermittent once  sodium chloride 0.9%. 1000 milliLiter(s) IV Continuous <Continuous>  traMADol 50 milliGRAM(s) Oral every 6 hours PRN

## 2021-03-07 LAB
ALBUMIN SERPL ELPH-MCNC: 2 G/DL — LOW (ref 3.3–5)
ALP SERPL-CCNC: 54 U/L — SIGNIFICANT CHANGE UP (ref 40–120)
ALT FLD-CCNC: 16 U/L — SIGNIFICANT CHANGE UP (ref 12–78)
ANION GAP SERPL CALC-SCNC: 7 MMOL/L — SIGNIFICANT CHANGE UP (ref 5–17)
AST SERPL-CCNC: 30 U/L — SIGNIFICANT CHANGE UP (ref 15–37)
BILIRUB SERPL-MCNC: 0.9 MG/DL — SIGNIFICANT CHANGE UP (ref 0.2–1.2)
BUN SERPL-MCNC: 29 MG/DL — HIGH (ref 7–23)
CALCIUM SERPL-MCNC: 8.8 MG/DL — SIGNIFICANT CHANGE UP (ref 8.5–10.1)
CHLORIDE SERPL-SCNC: 109 MMOL/L — HIGH (ref 96–108)
CO2 SERPL-SCNC: 29 MMOL/L — SIGNIFICANT CHANGE UP (ref 22–31)
CREAT SERPL-MCNC: 0.67 MG/DL — SIGNIFICANT CHANGE UP (ref 0.5–1.3)
GLUCOSE SERPL-MCNC: 75 MG/DL — SIGNIFICANT CHANGE UP (ref 70–99)
HCT VFR BLD CALC: 42.8 % — SIGNIFICANT CHANGE UP (ref 34.5–45)
HGB BLD-MCNC: 13.5 G/DL — SIGNIFICANT CHANGE UP (ref 11.5–15.5)
MCHC RBC-ENTMCNC: 28.7 PG — SIGNIFICANT CHANGE UP (ref 27–34)
MCHC RBC-ENTMCNC: 31.5 GM/DL — LOW (ref 32–36)
MCV RBC AUTO: 91.1 FL — SIGNIFICANT CHANGE UP (ref 80–100)
NRBC # BLD: 0 /100 WBCS — SIGNIFICANT CHANGE UP (ref 0–0)
PLATELET # BLD AUTO: 227 K/UL — SIGNIFICANT CHANGE UP (ref 150–400)
POTASSIUM SERPL-MCNC: 4.2 MMOL/L — SIGNIFICANT CHANGE UP (ref 3.5–5.3)
POTASSIUM SERPL-SCNC: 4.2 MMOL/L — SIGNIFICANT CHANGE UP (ref 3.5–5.3)
PROT SERPL-MCNC: 6.4 GM/DL — SIGNIFICANT CHANGE UP (ref 6–8.3)
RBC # BLD: 4.7 M/UL — SIGNIFICANT CHANGE UP (ref 3.8–5.2)
RBC # FLD: 17.1 % — HIGH (ref 10.3–14.5)
SARS-COV-2 IGG SERPL QL IA: NEGATIVE — SIGNIFICANT CHANGE UP
SARS-COV-2 IGM SERPL IA-ACNC: <0.1 INDEX — SIGNIFICANT CHANGE UP
SODIUM SERPL-SCNC: 145 MMOL/L — SIGNIFICANT CHANGE UP (ref 135–145)
WBC # BLD: 7.54 K/UL — SIGNIFICANT CHANGE UP (ref 3.8–10.5)
WBC # FLD AUTO: 7.54 K/UL — SIGNIFICANT CHANGE UP (ref 3.8–10.5)

## 2021-03-07 PROCEDURE — 99233 SBSQ HOSP IP/OBS HIGH 50: CPT

## 2021-03-07 RX ORDER — METOPROLOL TARTRATE 50 MG
2.5 TABLET ORAL ONCE
Refills: 0 | Status: COMPLETED | OUTPATIENT
Start: 2021-03-07 | End: 2021-03-07

## 2021-03-07 RX ORDER — METOPROLOL TARTRATE 50 MG
2.5 TABLET ORAL ONCE
Refills: 0 | Status: DISCONTINUED | OUTPATIENT
Start: 2021-03-07 | End: 2021-03-07

## 2021-03-07 RX ADMIN — ENOXAPARIN SODIUM 60 MILLIGRAM(S): 100 INJECTION SUBCUTANEOUS at 17:14

## 2021-03-07 RX ADMIN — Medication 2.5 MILLIGRAM(S): at 17:14

## 2021-03-07 RX ADMIN — LIDOCAINE 1 PATCH: 4 CREAM TOPICAL at 09:10

## 2021-03-07 RX ADMIN — SODIUM CHLORIDE 75 MILLILITER(S): 9 INJECTION INTRAMUSCULAR; INTRAVENOUS; SUBCUTANEOUS at 20:48

## 2021-03-07 RX ADMIN — LOSARTAN POTASSIUM 100 MILLIGRAM(S): 100 TABLET, FILM COATED ORAL at 05:21

## 2021-03-07 RX ADMIN — LIDOCAINE 1 PATCH: 4 CREAM TOPICAL at 17:15

## 2021-03-07 RX ADMIN — ENOXAPARIN SODIUM 60 MILLIGRAM(S): 100 INJECTION SUBCUTANEOUS at 05:21

## 2021-03-07 RX ADMIN — Medication 12.5 MILLIGRAM(S): at 05:20

## 2021-03-07 RX ADMIN — TRAMADOL HYDROCHLORIDE 50 MILLIGRAM(S): 50 TABLET ORAL at 00:45

## 2021-03-07 RX ADMIN — LIDOCAINE 1 PATCH: 4 CREAM TOPICAL at 05:21

## 2021-03-07 NOTE — PROGRESS NOTE ADULT - SUBJECTIVE AND OBJECTIVE BOX
Patient is a 88y old  Female who presents with a chief complaint of AMS, weakness, elevated trop (06 Mar 2021 02:00)      INTERVAL HPI/OVERNIGHT EVENTS: no events     MEDICATIONS  (STANDING):  donepezil 10 milliGRAM(s) Oral at bedtime  enoxaparin Injectable 60 milliGRAM(s) SubCutaneous two times a day  lidocaine   Patch 1 Patch Transdermal every 24 hours  losartan 100 milliGRAM(s) Oral daily  metoprolol tartrate 12.5 milliGRAM(s) Oral two times a day  sodium chloride 0.9%. 1000 milliLiter(s) (75 mL/Hr) IV Continuous <Continuous>    MEDICATIONS  (PRN):  acetaminophen   Tablet .. 650 milliGRAM(s) Oral every 6 hours PRN Mild Pain (1 - 3)  ketorolac   Injectable 15 milliGRAM(s) IV Push every 8 hours PRN Moderate Pain (4 - 6)  ondansetron Injectable 4 milliGRAM(s) IV Push every 6 hours PRN Nausea and/or Vomiting  traMADol 50 milliGRAM(s) Oral every 6 hours PRN Moderate Pain (4 - 6)      Allergies    No Known Allergies    Intolerances         Vital Signs Last 24 Hrs  T(C): 36.2 (07 Mar 2021 04:55), Max: 36.7 (06 Mar 2021 17:14)  T(F): 97.2 (07 Mar 2021 04:55), Max: 98.1 (06 Mar 2021 17:14)  HR: 99 (07 Mar 2021 04:55) (79 - 99)  BP: 132/93 (07 Mar 2021 04:55) (129/89 - 148/107)  BP(mean): --  RR: 19 (07 Mar 2021 04:55) (19 - 20)  SpO2: 95% (07 Mar 2021 04:55) (95% - 99%)    PHYSICAL EXAM:  GENERAL: NAD, well-groomed, well-developed  HEAD:  Atraumatic, Normocephalic  EYES: EOMI, PERRLA, conjunctiva and sclera clear  ENMT: No tonsillar erythema, exudates, or enlargement; Moist mucous membranes, Good dentition, No lesions  NECK: Supple, No JVD, Normal thyroid  NERVOUS SYSTEM:  Alert & Oriented X1,  ; Motor Strength 5/5 B/L upper and lower extremities; DTRs 2+ intact and symmetric  CHEST/LUNG: Clear to percussion bilaterally; No rales, rhonchi, wheezing, or rubs  HEART: Regular rate and rhythm; No murmurs, rubs, or gallops  ABDOMEN: Soft, Nontender, Nondistended; Bowel sounds present  EXTREMITIES:  2+ Peripheral Pulses, No clubbing, cyanosis, or edema  LYMPH: No lymphadenopathy noted  SKIN: No rashes or lesions    LABS:                        13.5   7.54  )-----------( 227      ( 07 Mar 2021 08:50 )             42.8     03-06    143  |  109<H>  |  32<H>  ----------------------------<  111<H>  4.1   |  21<L>  |  0.95    Ca    8.6      06 Mar 2021 08:44  Phos  3.4     03-06  Mg     1.8     03-06    TPro  7.3  /  Alb  2.2<L>  /  TBili  1.1  /  DBili  x   /  AST  41<H>  /  ALT  22  /  AlkPhos  65  03-06    PT/INR - ( 05 Mar 2021 21:33 )   PT: 14.3 sec;   INR: 1.25 ratio         PTT - ( 05 Mar 2021 21:33 )  PTT:30.2 sec  Urinalysis Basic - ( 06 Mar 2021 20:33 )    Color: Sandra / Appearance: Slightly Turbid / S.015 / pH: x  Gluc: x / Ketone: Trace  / Bili: Small / Urobili: 12 mg/dL   Blood: x / Protein: 100 mg/dL / Nitrite: Negative   Leuk Esterase: Trace / RBC: 3-5 /HPF / WBC 3-5   Sq Epi: x / Non Sq Epi: Few / Bacteria: Moderate      CAPILLARY BLOOD GLUCOSE          RADIOLOGY & ADDITIONAL TESTS:    Imaging Personally Reviewed:  [ X] YES  [ ] NO    Consultant(s) Notes Reviewed:  [ X] YES  [ ] NO    Care Discussed with Consultants/Other Providers [X ] YES  [ ] NO

## 2021-03-07 NOTE — PROGRESS NOTE ADULT - PROBLEM SELECTOR PLAN 3
- ? dislocation  - per Ortho does not appear dislocated on Xray, possible old hematoma, f/u official consult  - lidoderm patch for pain

## 2021-03-07 NOTE — PROGRESS NOTE ADULT - ASSESSMENT
89 y/o female w/ PMHx of HTN, recently diagnosed w/ Dementia and Atrial Fibrillation by PCP 3 weeks ago presents to the ED w/ worsening AMS. Pt being admitted for further work up.

## 2021-03-08 PROCEDURE — 99233 SBSQ HOSP IP/OBS HIGH 50: CPT

## 2021-03-08 PROCEDURE — 70551 MRI BRAIN STEM W/O DYE: CPT | Mod: 26

## 2021-03-08 RX ORDER — SODIUM CHLORIDE 9 MG/ML
1000 INJECTION INTRAMUSCULAR; INTRAVENOUS; SUBCUTANEOUS
Refills: 0 | Status: DISCONTINUED | OUTPATIENT
Start: 2021-03-08 | End: 2021-03-09

## 2021-03-08 RX ORDER — METOPROLOL TARTRATE 50 MG
5 TABLET ORAL EVERY 6 HOURS
Refills: 0 | Status: DISCONTINUED | OUTPATIENT
Start: 2021-03-08 | End: 2021-03-08

## 2021-03-08 RX ORDER — CEFTRIAXONE 500 MG/1
1000 INJECTION, POWDER, FOR SOLUTION INTRAMUSCULAR; INTRAVENOUS EVERY 24 HOURS
Refills: 0 | Status: DISCONTINUED | OUTPATIENT
Start: 2021-03-08 | End: 2021-03-09

## 2021-03-08 RX ORDER — METOPROLOL TARTRATE 50 MG
5 TABLET ORAL EVERY 12 HOURS
Refills: 0 | Status: DISCONTINUED | OUTPATIENT
Start: 2021-03-08 | End: 2021-03-08

## 2021-03-08 RX ORDER — CEFTRIAXONE 500 MG/1
INJECTION, POWDER, FOR SOLUTION INTRAMUSCULAR; INTRAVENOUS
Refills: 0 | Status: DISCONTINUED | OUTPATIENT
Start: 2021-03-08 | End: 2021-03-08

## 2021-03-08 RX ORDER — METOPROLOL TARTRATE 50 MG
25 TABLET ORAL
Refills: 0 | Status: DISCONTINUED | OUTPATIENT
Start: 2021-03-08 | End: 2021-03-11

## 2021-03-08 RX ORDER — AMLODIPINE BESYLATE 2.5 MG/1
5 TABLET ORAL DAILY
Refills: 0 | Status: DISCONTINUED | OUTPATIENT
Start: 2021-03-08 | End: 2021-03-11

## 2021-03-08 RX ADMIN — ENOXAPARIN SODIUM 60 MILLIGRAM(S): 100 INJECTION SUBCUTANEOUS at 05:33

## 2021-03-08 RX ADMIN — Medication 25 MILLIGRAM(S): at 17:09

## 2021-03-08 RX ADMIN — LIDOCAINE 1 PATCH: 4 CREAM TOPICAL at 05:32

## 2021-03-08 RX ADMIN — SODIUM CHLORIDE 50 MILLILITER(S): 9 INJECTION INTRAMUSCULAR; INTRAVENOUS; SUBCUTANEOUS at 20:10

## 2021-03-08 RX ADMIN — AMLODIPINE BESYLATE 5 MILLIGRAM(S): 2.5 TABLET ORAL at 12:26

## 2021-03-08 RX ADMIN — Medication 5 MILLIGRAM(S): at 05:35

## 2021-03-08 RX ADMIN — TRAMADOL HYDROCHLORIDE 50 MILLIGRAM(S): 50 TABLET ORAL at 11:18

## 2021-03-08 RX ADMIN — CEFTRIAXONE 100 MILLIGRAM(S): 500 INJECTION, POWDER, FOR SOLUTION INTRAMUSCULAR; INTRAVENOUS at 20:10

## 2021-03-08 RX ADMIN — ENOXAPARIN SODIUM 60 MILLIGRAM(S): 100 INJECTION SUBCUTANEOUS at 17:09

## 2021-03-08 RX ADMIN — TRAMADOL HYDROCHLORIDE 50 MILLIGRAM(S): 50 TABLET ORAL at 10:00

## 2021-03-08 RX ADMIN — LIDOCAINE 1 PATCH: 4 CREAM TOPICAL at 07:40

## 2021-03-08 RX ADMIN — LIDOCAINE 1 PATCH: 4 CREAM TOPICAL at 17:09

## 2021-03-08 NOTE — SWALLOW BEDSIDE ASSESSMENT ADULT - MODE OF PRESENTATION
spoon/fed by clinician cup/spoon/self fed/fed by clinician cup/self fed cup/fed by clinician fed by clinician

## 2021-03-08 NOTE — SWALLOW BEDSIDE ASSESSMENT ADULT - SWALLOW EVAL: DIAGNOSIS
Oropharyngeal dysphagia marked by delayed transit of bolus, lateral loss of bolus with thin liquids, decreased mastication ability with mechanical soft solid, and suspected delay in pharyngeal swallow.

## 2021-03-08 NOTE — SWALLOW BEDSIDE ASSESSMENT ADULT - SWALLOW EVAL: ORAL MUSCULATURE
generally intact informal observation/generally intact/unable to assess due to poor participation/comprehension

## 2021-03-08 NOTE — SWALLOW BEDSIDE ASSESSMENT ADULT - H & P REVIEW
87 y/o female w/ PMHx of HTN, recently diagnosed w/ Dementia (started on Aricept, referred to Neuro) and Atrial Fibrillation by PCP 3 weeks ago (not started on any medications, referred to Cardio) presents to the ED w/ worsening AMS. Pt minimally verbal, moans at times, lethargic, hx obtained from daughter Kim. Daughter reports in the last 3-4 days pt has been "delusional", seeing things that are not there, tangential during conversation, less talkative, staying in bed, NO PO intake, +ve urinary and bowel incontinence. Daughter reports 3 days ago pt slid off the chair to her buttock, no head trauma or LOC, no other trauma to other joints. Today daughter noted pt c/o left shoulder and arm pain, daughter denies any trauma to shoulder (pt watched almost ATC between her and her son). Pt also c/o back pain however daughter states that is chronic for years, unchanged from baseline as far as she knows. Per daughter no other complaints; no cp, fever or chills, n/v, abd pain or diarrhea./yes

## 2021-03-08 NOTE — SWALLOW BEDSIDE ASSESSMENT ADULT - SLP GENERAL OBSERVATIONS
Pt seen bedside alert and oriented x2. Pt answered simple questions for assessment and inconsistently followed directions for oral [facility] exam. Speech intelligibility good for conversational speech, noted confusion and off topic jargon. Pt seen bedside alert and oriented x2. Pt answered simple questions for assessment and inconsistently followed directions for oral [facility] exam. Speech intelligibility good for conversational speech, noted confusion and tangential speech. Pt seen bedside alert and oriented x2. Pt answered simple questions for assessment and inconsistently followed directions for oral [facility] exam. Speech intelligibility good for conversational speech, noted confusion and tangential comments. Pt seen bedside alert and oriented x2. Pt answered simple questions for assessment, noted confusion and tangential comments and she inconsistently followed directions for oral [facility] exam. Speech intelligibility good for conversational speech.  pt leaning to the right despite repositioning and cue provided by SLP

## 2021-03-08 NOTE — SWALLOW BEDSIDE ASSESSMENT ADULT - COMMENTS
CT head no contrast 3/5/2021 IMPRESSION: Head CT: No acute intracranial hemorrhage, vasogenic edema, extra-axial collection or hydrocephalus. Mild chronic microvascular change.    CXR 3/5/2021 IMPRESSION: No acute pulmonary pathology. Mild cardiomegaly

## 2021-03-08 NOTE — PHYSICAL THERAPY INITIAL EVALUATION ADULT - CRITERIA FOR SKILLED THERAPEUTIC INTERVENTIONS
Sub acute rehab/impairments found/functional limitations in following categories/risk reduction/prevention/rehab potential/therapy frequency/anticipated discharge recommendation Sub acute rehab/impairments found/functional limitations in following categories/risk reduction/prevention/rehab potential/therapy frequency/predicted duration of therapy intervention/anticipated discharge recommendation

## 2021-03-08 NOTE — PHYSICAL THERAPY INITIAL EVALUATION ADULT - MANUAL MUSCLE TESTING RESULTS, REHAB EVAL
Muscle strength was assessed by observation of AROM, shoulder flexion 3/5, Knee extension 3-/5 and hand strength was 3-/5

## 2021-03-08 NOTE — PROGRESS NOTE ADULT - ASSESSMENT
87 y/o female w/ PMHx of HTN, recently diagnosed w/ Dementia- started on Aricept  (started on Aricept, referred to Neuro) and Atrial Fibrillation by PCP 3 weeks ago (not started on any medications, referred to Cardio) presents to the ED w/ worsening AMS.     AMS  - CT head neg  - CT chest neg  - COVID neg  -MRI of brain - No acute infarct, hemorrhage, or mass effect.  -Metabolic Encephalopathy could also be due to UTI? - no wbc, no fevers, UA negative, however Ucx-positive for E.coli with more than 50,000 organisms- will start on antibiotics for 3 days see, will observe for changes in mental status.   -will also dc Aricept- possibly ams from side effects  -continue hydration      Atrial fibrillation, unspecified type  - started on low dose BB, increased today to 25mg BID due to elevated BP  - on lovenox   - monitor on tele  - f/u TSH-elevated, will get T3/T4  - 2D Echo pending    ·  Problem: Acute pain of left shoulder.  Plan: - ? dislocation  - per Ortho does not appear dislocated on Xray, possible old hematoma, f/u official consult  - pain control pt not complaining of pain today     Elevated troponin.  Plan: - likely demand due to Afib  - cont to trend till peak  - low dose ASA  - f/u Echo  - BB.     Hypokalemia-continue to monitor     Incontinence of feces, unspecified fecal incontinence type. Plan: - also of urine, new per daughter, unsure if due to weakness  - Neuro exam limited  - f/u CT L spine-Degenerative change involving the lumbar spine. No acute fractures or dislocations are seen    Dysphagia-passed today  .    Dementia without behavioral disturbance, unspecified dementia type.  will d/c Aricept for now- could be from side effects     Essential hypertension.  Plan: - c/w Atacand--> Losartan.      DVT prophylaxis. Plan; - on Eliquis    Daughter Kim whom pt lives w/ and is also HCP, pt DNR/DNI

## 2021-03-08 NOTE — SWALLOW BEDSIDE ASSESSMENT ADULT - ORAL PREPARATORY PHASE
Within functional limits Lateral loss of bolus Decreased mastication ability may be secondary to head tilted to the right/Lateral loss of bolus

## 2021-03-08 NOTE — SWALLOW BEDSIDE ASSESSMENT ADULT - ORAL PHASE
Decreased anterior-posterior movement of the bolus Within functional limits Delayed oral transit time

## 2021-03-08 NOTE — PHYSICAL THERAPY INITIAL EVALUATION ADULT - GENERAL OBSERVATIONS, REHAB EVAL
Patient was seen supine in bed with cardiac monitor, ornelas, and IV attached and intact with pt in no apparent distress.

## 2021-03-08 NOTE — SWALLOW BEDSIDE ASSESSMENT ADULT - POSITIONING
Head leaning right/upright (90 degrees) pt leaning to the right despite repositioning/upright (90 degrees) upright (90 degrees)

## 2021-03-08 NOTE — PHYSICAL THERAPY INITIAL EVALUATION ADULT - ACTIVE RANGE OF MOTION EXAMINATION, REHAB EVAL
From observation Right UE and Left elbow are WFL, Pt Right and left knee ROM WFL,  Ankle AROM unable to assess do to cognitive impairment.

## 2021-03-08 NOTE — SWALLOW BEDSIDE ASSESSMENT ADULT - SLP PRECAUTIONS/LIMITATIONS: VISION
Pt had eyes closed most of the evaluation Pt had eyes closed most of the evaluation despite cues to open

## 2021-03-08 NOTE — PROGRESS NOTE ADULT - SUBJECTIVE AND OBJECTIVE BOX
Patient is a 88y old  Female who presents with a chief complaint of AMS, weakness, elevated trop (07 Mar 2021 09:53)      INTERVAL HPI/OVERNIGHT EVENTS: Pt seen this morning, confused, not able to provide any information about how's she's feeling  BP elevated     MEDICATIONS  (STANDING):  amLODIPine   Tablet 5 milliGRAM(s) Oral daily  donepezil 10 milliGRAM(s) Oral at bedtime  enoxaparin Injectable 60 milliGRAM(s) SubCutaneous two times a day  lidocaine   Patch 1 Patch Transdermal every 24 hours  losartan 100 milliGRAM(s) Oral daily  metoprolol tartrate 25 milliGRAM(s) Oral two times a day    MEDICATIONS  (PRN):  acetaminophen   Tablet .. 650 milliGRAM(s) Oral every 6 hours PRN Mild Pain (1 - 3)  ketorolac   Injectable 15 milliGRAM(s) IV Push every 8 hours PRN Moderate Pain (4 - 6)  ondansetron Injectable 4 milliGRAM(s) IV Push every 6 hours PRN Nausea and/or Vomiting  traMADol 50 milliGRAM(s) Oral every 6 hours PRN Moderate Pain (4 - 6)      Allergies    No Known Allergies    Intolerances        REVIEW OF SYSTEMS:  unable to obtain due to ams    Vital Signs Last 24 Hrs  T(C): 36.5 (08 Mar 2021 15:37), Max: 36.7 (08 Mar 2021 05:05)  T(F): 97.7 (08 Mar 2021 15:37), Max: 98 (08 Mar 2021 05:05)  HR: 66 (08 Mar 2021 15:37) (66 - 101)  BP: 145/102 (08 Mar 2021 18:28) (145/102 - 173/107)  BP(mean): --  RR: 18 (08 Mar 2021 15:37) (18 - 19)  SpO2: 97% (08 Mar 2021 15:37) (94% - 99%)    PHYSICAL EXAM:  GENERAL: NAD, well-groomed, well-developed  HEAD:  Atraumatic, Normocephalic  EYES: EOMI, PERRLA, conjunctiva and sclera clear  ENMT: No tonsillar erythema, exudates, or enlargement; Moist mucous membranes,   NECK: Supple, No JVD, Normal thyroid  NERVOUS SYSTEM:  Alert & Oriented X1 (name), unable to follow commands  CHEST/LUNG: Clear to percussion bilaterally; No rales, rhonchi, wheezing, or rubs  HEART: Regular rate and rhythm; No murmurs, rubs, or gallops  ABDOMEN: Soft, Nontender, Nondistended; Bowel sounds present  EXTREMITIES:  2+ Peripheral Pulses, No clubbing, cyanosis, or edema  LYMPH: No lymphadenopathy noted  SKIN: No rashes or lesions    LABS:                        13.5   7.54  )-----------( 227      ( 07 Mar 2021 08:50 )             42.8     03-07    145  |  109<H>  |  29<H>  ----------------------------<  75  4.2   |  29  |  0.67    Ca    8.8      07 Mar 2021 08:50    TPro  6.4  /  Alb  2.0<L>  /  TBili  0.9  /  DBili  x   /  AST  30  /  ALT  16  /  AlkPhos  54  03-07      Urinalysis Basic - ( 06 Mar 2021 20:33 )    Color: Sandra / Appearance: Slightly Turbid / S.015 / pH: x  Gluc: x / Ketone: Trace  / Bili: Small / Urobili: 12 mg/dL   Blood: x / Protein: 100 mg/dL / Nitrite: Negative   Leuk Esterase: Trace / RBC: 3-5 /HPF / WBC 3-5   Sq Epi: x / Non Sq Epi: Few / Bacteria: Moderate      CAPILLARY BLOOD GLUCOSE

## 2021-03-08 NOTE — PHYSICAL THERAPY INITIAL EVALUATION ADULT - ADDITIONAL COMMENTS
There are no steps at the entry of the apartment and pt has an elevator available to get up to the floor for her home.  Pt lives alone with home health aide that comes 2 days a week for 6 hours.  Pt daughter comes to see her occasionally.  Up until 2 weeks ago patient was independent with ambulation within her apartment using a rolling walker.  Information was obtained from pt daughter Kim, Phone number# [phone].

## 2021-03-08 NOTE — PHYSICAL THERAPY INITIAL EVALUATION ADULT - IMPAIRMENTS FOUND, PT EVAL
cognitive impairment/muscle strength/poor safety awareness aerobic capacity/endurance/cognitive impairment/gait, locomotion, and balance/muscle strength/poor safety awareness/posture

## 2021-03-09 LAB
-  AMIKACIN: SIGNIFICANT CHANGE UP
-  AMOXICILLIN/CLAVULANIC ACID: SIGNIFICANT CHANGE UP
-  AMPICILLIN/SULBACTAM: SIGNIFICANT CHANGE UP
-  AMPICILLIN: SIGNIFICANT CHANGE UP
-  AZTREONAM: SIGNIFICANT CHANGE UP
-  CEFAZOLIN: SIGNIFICANT CHANGE UP
-  CEFEPIME: SIGNIFICANT CHANGE UP
-  CEFOXITIN: SIGNIFICANT CHANGE UP
-  CEFTRIAXONE: SIGNIFICANT CHANGE UP
-  CIPROFLOXACIN: SIGNIFICANT CHANGE UP
-  ERTAPENEM: SIGNIFICANT CHANGE UP
-  GENTAMICIN: SIGNIFICANT CHANGE UP
-  IMIPENEM: SIGNIFICANT CHANGE UP
-  LEVOFLOXACIN: SIGNIFICANT CHANGE UP
-  MEROPENEM: SIGNIFICANT CHANGE UP
-  NITROFURANTOIN: SIGNIFICANT CHANGE UP
-  PIPERACILLIN/TAZOBACTAM: SIGNIFICANT CHANGE UP
-  TIGECYCLINE: SIGNIFICANT CHANGE UP
-  TOBRAMYCIN: SIGNIFICANT CHANGE UP
-  TRIMETHOPRIM/SULFAMETHOXAZOLE: SIGNIFICANT CHANGE UP
ANION GAP SERPL CALC-SCNC: 6 MMOL/L — SIGNIFICANT CHANGE UP (ref 5–17)
BUN SERPL-MCNC: 33 MG/DL — HIGH (ref 7–23)
CALCIUM SERPL-MCNC: 9 MG/DL — SIGNIFICANT CHANGE UP (ref 8.5–10.1)
CHLORIDE SERPL-SCNC: 118 MMOL/L — HIGH (ref 96–108)
CO2 SERPL-SCNC: 26 MMOL/L — SIGNIFICANT CHANGE UP (ref 22–31)
CREAT SERPL-MCNC: 0.73 MG/DL — SIGNIFICANT CHANGE UP (ref 0.5–1.3)
CULTURE RESULTS: SIGNIFICANT CHANGE UP
FLUAV AG NPH QL: SIGNIFICANT CHANGE UP
FLUBV AG NPH QL: SIGNIFICANT CHANGE UP
GLUCOSE SERPL-MCNC: 137 MG/DL — HIGH (ref 70–99)
HCT VFR BLD CALC: 45.7 % — HIGH (ref 34.5–45)
HGB BLD-MCNC: 14.4 G/DL — SIGNIFICANT CHANGE UP (ref 11.5–15.5)
MAGNESIUM SERPL-MCNC: 1.8 MG/DL — SIGNIFICANT CHANGE UP (ref 1.6–2.6)
MCHC RBC-ENTMCNC: 28.2 PG — SIGNIFICANT CHANGE UP (ref 27–34)
MCHC RBC-ENTMCNC: 31.5 GM/DL — LOW (ref 32–36)
MCV RBC AUTO: 89.4 FL — SIGNIFICANT CHANGE UP (ref 80–100)
METHOD TYPE: SIGNIFICANT CHANGE UP
NRBC # BLD: 0 /100 WBCS — SIGNIFICANT CHANGE UP (ref 0–0)
ORGANISM # SPEC MICROSCOPIC CNT: SIGNIFICANT CHANGE UP
ORGANISM # SPEC MICROSCOPIC CNT: SIGNIFICANT CHANGE UP
PLATELET # BLD AUTO: 247 K/UL — SIGNIFICANT CHANGE UP (ref 150–400)
POTASSIUM SERPL-MCNC: 4.2 MMOL/L — SIGNIFICANT CHANGE UP (ref 3.5–5.3)
POTASSIUM SERPL-SCNC: 4.2 MMOL/L — SIGNIFICANT CHANGE UP (ref 3.5–5.3)
RBC # BLD: 5.11 M/UL — SIGNIFICANT CHANGE UP (ref 3.8–5.2)
RBC # FLD: 17 % — HIGH (ref 10.3–14.5)
SARS-COV-2 RNA SPEC QL NAA+PROBE: SIGNIFICANT CHANGE UP
SODIUM SERPL-SCNC: 150 MMOL/L — HIGH (ref 135–145)
SPECIMEN SOURCE: SIGNIFICANT CHANGE UP
WBC # BLD: 9.79 K/UL — SIGNIFICANT CHANGE UP (ref 3.8–10.5)
WBC # FLD AUTO: 9.79 K/UL — SIGNIFICANT CHANGE UP (ref 3.8–10.5)

## 2021-03-09 PROCEDURE — 99233 SBSQ HOSP IP/OBS HIGH 50: CPT

## 2021-03-09 PROCEDURE — 93306 TTE W/DOPPLER COMPLETE: CPT | Mod: 26

## 2021-03-09 RX ORDER — APIXABAN 2.5 MG/1
5 TABLET, FILM COATED ORAL
Refills: 0 | Status: DISCONTINUED | OUTPATIENT
Start: 2021-03-09 | End: 2021-03-11

## 2021-03-09 RX ADMIN — ENOXAPARIN SODIUM 60 MILLIGRAM(S): 100 INJECTION SUBCUTANEOUS at 05:26

## 2021-03-09 RX ADMIN — APIXABAN 5 MILLIGRAM(S): 2.5 TABLET, FILM COATED ORAL at 17:11

## 2021-03-09 RX ADMIN — Medication 25 MILLIGRAM(S): at 05:27

## 2021-03-09 RX ADMIN — Medication 25 MILLIGRAM(S): at 17:11

## 2021-03-09 RX ADMIN — LIDOCAINE 1 PATCH: 4 CREAM TOPICAL at 05:26

## 2021-03-09 RX ADMIN — AMLODIPINE BESYLATE 5 MILLIGRAM(S): 2.5 TABLET ORAL at 05:27

## 2021-03-09 RX ADMIN — LOSARTAN POTASSIUM 100 MILLIGRAM(S): 100 TABLET, FILM COATED ORAL at 05:27

## 2021-03-09 NOTE — PROGRESS NOTE ADULT - SUBJECTIVE AND OBJECTIVE BOX
Patient is a 88y old  Female who presents with a chief complaint of AMS, weakness, elevated trop (07 Mar 2021 09:53)      INTERVAL HPI/OVERNIGHT EVENTS: Pt seen this morning, confused, not able to provide any information about how's she's feeling  BP elevated     MEDICATIONS  (STANDING):  amLODIPine   Tablet 5 milliGRAM(s) Oral daily  donepezil 10 milliGRAM(s) Oral at bedtime  enoxaparin Injectable 60 milliGRAM(s) SubCutaneous two times a day  lidocaine   Patch 1 Patch Transdermal every 24 hours  losartan 100 milliGRAM(s) Oral daily  metoprolol tartrate 25 milliGRAM(s) Oral two times a day    MEDICATIONS  (PRN):  acetaminophen   Tablet .. 650 milliGRAM(s) Oral every 6 hours PRN Mild Pain (1 - 3)  ketorolac   Injectable 15 milliGRAM(s) IV Push every 8 hours PRN Moderate Pain (4 - 6)  ondansetron Injectable 4 milliGRAM(s) IV Push every 6 hours PRN Nausea and/or Vomiting  traMADol 50 milliGRAM(s) Oral every 6 hours PRN Moderate Pain (4 - 6)      Allergies    No Known Allergies    Intolerances        REVIEW OF SYSTEMS:  unable to obtain due to ams    Vital Signs Last 24 Hrs  T(C): 36.5 (08 Mar 2021 15:37), Max: 36.7 (08 Mar 2021 05:05)  T(F): 97.7 (08 Mar 2021 15:37), Max: 98 (08 Mar 2021 05:05)  HR: 66 (08 Mar 2021 15:37) (66 - 101)  BP: 145/102 (08 Mar 2021 18:28) (145/102 - 173/107)  BP(mean): --  RR: 18 (08 Mar 2021 15:37) (18 - 19)  SpO2: 97% (08 Mar 2021 15:37) (94% - 99%)    PHYSICAL EXAM:  GENERAL: NAD, well-groomed, well-developed  HEAD:  Atraumatic, Normocephalic  EYES: EOMI, PERRLA, conjunctiva and sclera clear  ENMT: No tonsillar erythema, exudates, or enlargement; Moist mucous membranes,   NECK: Supple, No JVD, Normal thyroid  NERVOUS SYSTEM:  Alert & Oriented X1 (name), unable to follow commands  CHEST/LUNG: Clear to percussion bilaterally; No rales, rhonchi, wheezing, or rubs  HEART: Regular rate and rhythm; No murmurs, rubs, or gallops  ABDOMEN: Soft, Nontender, Nondistended; Bowel sounds present  EXTREMITIES:  2+ Peripheral Pulses, No clubbing, cyanosis, or edema  LYMPH: No lymphadenopathy noted  SKIN: No rashes or lesions    LABS:                        13.5   7.54  )-----------( 227      ( 07 Mar 2021 08:50 )             42.8     03-07    145  |  109<H>  |  29<H>  ----------------------------<  75  4.2   |  29  |  0.67    Ca    8.8      07 Mar 2021 08:50    TPro  6.4  /  Alb  2.0<L>  /  TBili  0.9  /  DBili  x   /  AST  30  /  ALT  16  /  AlkPhos  54  03-07      Urinalysis Basic - ( 06 Mar 2021 20:33 )    Color: Sandra / Appearance: Slightly Turbid / S.015 / pH: x  Gluc: x / Ketone: Trace  / Bili: Small / Urobili: 12 mg/dL   Blood: x / Protein: 100 mg/dL / Nitrite: Negative   Leuk Esterase: Trace / RBC: 3-5 /HPF / WBC 3-5   Sq Epi: x / Non Sq Epi: Few / Bacteria: Moderate      CAPILLARY BLOOD GLUCOSE         Patient is a 88y old  Female who presents with a chief complaint of AMS, weakness, elevated trop (07 Mar 2021 09:53)      INTERVAL HPI/OVERNIGHT EVENTS: Pt seen this morning, still confused, tired  BP stable    MEDICATIONS  (STANDING):  amLODIPine   Tablet 5 milliGRAM(s) Oral daily  donepezil 10 milliGRAM(s) Oral at bedtime  enoxaparin Injectable 60 milliGRAM(s) SubCutaneous two times a day  lidocaine   Patch 1 Patch Transdermal every 24 hours  losartan 100 milliGRAM(s) Oral daily  metoprolol tartrate 25 milliGRAM(s) Oral two times a day    MEDICATIONS  (PRN):  acetaminophen   Tablet .. 650 milliGRAM(s) Oral every 6 hours PRN Mild Pain (1 - 3)  ketorolac   Injectable 15 milliGRAM(s) IV Push every 8 hours PRN Moderate Pain (4 - 6)  ondansetron Injectable 4 milliGRAM(s) IV Push every 6 hours PRN Nausea and/or Vomiting  traMADol 50 milliGRAM(s) Oral every 6 hours PRN Moderate Pain (4 - 6)      Allergies    No Known Allergies    Intolerances        REVIEW OF SYSTEMS:  unable to obtain due to ams    Vital Signs Last 24 Hrs  T(C): 36.5 (08 Mar 2021 15:37), Max: 36.7 (08 Mar 2021 05:05)  T(F): 97.7 (08 Mar 2021 15:37), Max: 98 (08 Mar 2021 05:05)  HR: 66 (08 Mar 2021 15:37) (66 - 101)  BP: 145/102 (08 Mar 2021 18:28) (145/102 - 173/107)  BP(mean): --  RR: 18 (08 Mar 2021 15:37) (18 - 19)  SpO2: 97% (08 Mar 2021 15:37) (94% - 99%)    PHYSICAL EXAM:  GENERAL: NAD, well-groomed, well-developed  HEAD:  Atraumatic, Normocephalic  EYES: EOMI, PERRLA, conjunctiva and sclera clear  ENMT: No tonsillar erythema, exudates, or enlargement; Moist mucous membranes,   NECK: Supple, No JVD, Normal thyroid  NERVOUS SYSTEM:  Alert & Oriented X1 (name), unable to follow commands  CHEST/LUNG: Clear to percussion bilaterally; No rales, rhonchi, wheezing, or rubs  HEART: Regular rate and rhythm; No murmurs, rubs, or gallops  ABDOMEN: Soft, Nontender, Nondistended; Bowel sounds present  EXTREMITIES:  2+ Peripheral Pulses, No clubbing, cyanosis, or edema  LYMPH: No lymphadenopathy noted  SKIN: No rashes or lesions    LABS:                        13.5   7.54  )-----------( 227      ( 07 Mar 2021 08:50 )             42.8     03-07    145  |  109<H>  |  29<H>  ----------------------------<  75  4.2   |  29  |  0.67    Ca    8.8      07 Mar 2021 08:50    TPro  6.4  /  Alb  2.0<L>  /  TBili  0.9  /  DBili  x   /  AST  30  /  ALT  16  /  AlkPhos  54  03-07      Urinalysis Basic - ( 06 Mar 2021 20:33 )    Color: Sandra / Appearance: Slightly Turbid / S.015 / pH: x  Gluc: x / Ketone: Trace  / Bili: Small / Urobili: 12 mg/dL   Blood: x / Protein: 100 mg/dL / Nitrite: Negative   Leuk Esterase: Trace / RBC: 3-5 /HPF / WBC 3-5   Sq Epi: x / Non Sq Epi: Few / Bacteria: Moderate      CAPILLARY BLOOD GLUCOSE

## 2021-03-09 NOTE — PROGRESS NOTE ADULT - ASSESSMENT
87 y/o female w/ PMHx of HTN, recently diagnosed w/ Dementia- started on Aricept  (started on Aricept, referred to Neuro) and Atrial Fibrillation by PCP 3 weeks ago (not started on any medications, referred to Cardio) presents to the ED w/ worsening AMS.     AMS  - CT head neg  - CT chest neg  - COVID neg  -MRI of brain - No acute infarct, hemorrhage, or mass effect.  -Metabolic Encephalopathy could also be due to UTI? - no wbc, no fevers, UA negative, however Ucx-positive for E.coli with more than 50,000 organisms- will start on antibiotics for 3 days see, will observe for changes in mental status.   -will also dc Aricept- possibly ams from side effects  -continue hydration      Atrial fibrillation, unspecified type  - started on low dose BB, increased today to 25mg BID due to elevated BP  - on lovenox   - monitor on tele  - f/u TSH-elevated, will get T3/T4  - 2D Echo pending    ·  Problem: Acute pain of left shoulder.  Plan: - ? dislocation  - per Ortho does not appear dislocated on Xray, possible old hematoma, f/u official consult  - pain control pt not complaining of pain today     Elevated troponin.  Plan: - likely demand due to Afib  - cont to trend till peak  - low dose ASA  - f/u Echo  - BB.     Hypokalemia-continue to monitor     Incontinence of feces, unspecified fecal incontinence type. Plan: - also of urine, new per daughter, unsure if due to weakness  - Neuro exam limited  - f/u CT L spine-Degenerative change involving the lumbar spine. No acute fractures or dislocations are seen    Dysphagia-passed today  .    Dementia without behavioral disturbance, unspecified dementia type.  will d/c Aricept for now- could be from side effects     Essential hypertension.  Plan: - c/w Atacand--> Losartan.      DVT prophylaxis. Plan; - on Eliquis    Daughter Kim whom pt lives w/ and is also HCP, pt DNR/DNI     87 y/o female w/ PMHx of HTN, recently diagnosed w/ Dementia- started on Aricept  (started on Aricept, referred to Neuro) and Atrial Fibrillation by PCP 3 weeks ago (not started on any medications, referred to Cardio) presents to the ED w/ worsening AMS.     AMS  - CT head neg  - CT chest neg  - COVID neg  -MRI of brain - No acute infarct, hemorrhage, or mass effect.  -Metabolic Encephalopathy could also be due to UTI? - less likely- no improvement, will d/c antibiotic- side effects of antibiotics could potentially cause more harm vs. longer period of time of trying antibiotics.   -will also dc Aricept- possibly ams from side effects  -continue hydration      Atrial fibrillation, unspecified type  - started on low dose BB, increased today to 25mg BID due to elevated BP  - on lovenox   - monitor on tele  - f/u TSH-elevated, will get T3/T4  - 2D Echo pending-     Acute pain of left shoulder.  Plan: - ? dislocation  - per Ortho does not appear dislocated on Xray, possible old hematoma, f/u official consult  - pain control pt not complaining of pain today     Elevated troponin.  Plan: - likely demand due to Afib  - cont to trend till peak  - low dose ASA  - f/u Echo  - BB.     Hypokalemia-continue to monitor  Hypernatremia- will d/c fluids      Incontinence of feces, unspecified fecal incontinence type. Plan: - also of urine, new per daughter, unsure if due to weakness  - Neuro exam limited  - f/u CT L spine-Degenerative change involving the lumbar spine. No acute fractures or dislocations are seen    Dysphagia-passed       .    Dementia without behavioral disturbance, unspecified dementia type.  will d/c Aricept for now- could be from side effects       DVT prophylaxis. Plan; - on Eliquis    Daughter Kim whom pt lives w/ and is also HCP, pt DNR/DNI     89 y/o female w/ PMHx of HTN, recently diagnosed w/ Dementia- started on Aricept  (started on Aricept, referred to Neuro) and Atrial Fibrillation by PCP 3 weeks ago (not started on any medications, referred to Cardio) presents to the ED w/ worsening AMS.     AMS  - CT head neg  - CT chest neg  - COVID neg  -MRI of brain - No acute infarct, hemorrhage, or mass effect.  -Metabolic Encephalopathy could also be due to UTI? - less likely- no improvement, will d/c antibiotic- side effects of antibiotics could potentially cause more harm vs. longer period of time of trying antibiotics.   -will also dc Aricept- possibly ams from side effects  -continue hydration        Atrial fibrillation, unspecified type  - started on low dose BB, increased today to 25mg BID due to elevated BP  - on lovenox   - monitor on tele  - f/u TSH-elevated, will get T3/T4  - 2D Echo pending-     Acute pain of left shoulder.  Plan: - ? dislocation  - per Ortho does not appear dislocated on Xray, possible old hematoma, f/u official consult  - pain control pt not complaining of pain today     Elevated troponin.  Plan: - likely demand due to Afib  - cont to trend till peak  - low dose ASA  - f/u Echo  - BB.     Hypokalemia-continue to monitor  Hypernatremia- will d/c fluids      Incontinence of feces, unspecified fecal incontinence type. Plan: - also of urine, new per daughter, unsure if due to weakness  - Neuro exam limited  - f/u CT L spine-Degenerative change involving the lumbar spine. No acute fractures or dislocations are seen    Dysphagia-passed       .    Dementia without behavioral disturbance, unspecified dementia type.  will d/c Aricept for now- could be from side effects       DVT prophylaxis. Plan; - on Eliquis    Daughter Kim whom pt lives w/ and is also HCP, pt DNR/DNI    COVID test    Plan to go to Rehab

## 2021-03-10 VITALS
RESPIRATION RATE: 18 BRPM | TEMPERATURE: 99 F | OXYGEN SATURATION: 97 % | SYSTOLIC BLOOD PRESSURE: 130 MMHG | HEART RATE: 92 BPM | DIASTOLIC BLOOD PRESSURE: 83 MMHG

## 2021-03-10 LAB
ANION GAP SERPL CALC-SCNC: 4 MMOL/L — LOW (ref 5–17)
BUN SERPL-MCNC: 38 MG/DL — HIGH (ref 7–23)
CALCIUM SERPL-MCNC: 9.2 MG/DL — SIGNIFICANT CHANGE UP (ref 8.5–10.1)
CHLORIDE SERPL-SCNC: 119 MMOL/L — HIGH (ref 96–108)
CO2 SERPL-SCNC: 30 MMOL/L — SIGNIFICANT CHANGE UP (ref 22–31)
CREAT SERPL-MCNC: 0.79 MG/DL — SIGNIFICANT CHANGE UP (ref 0.5–1.3)
GLUCOSE SERPL-MCNC: 130 MG/DL — HIGH (ref 70–99)
POTASSIUM SERPL-MCNC: 3.9 MMOL/L — SIGNIFICANT CHANGE UP (ref 3.5–5.3)
POTASSIUM SERPL-SCNC: 3.9 MMOL/L — SIGNIFICANT CHANGE UP (ref 3.5–5.3)
SODIUM SERPL-SCNC: 153 MMOL/L — HIGH (ref 135–145)

## 2021-03-10 PROCEDURE — 99233 SBSQ HOSP IP/OBS HIGH 50: CPT

## 2021-03-10 RX ADMIN — LOSARTAN POTASSIUM 100 MILLIGRAM(S): 100 TABLET, FILM COATED ORAL at 05:14

## 2021-03-10 RX ADMIN — LIDOCAINE 1 PATCH: 4 CREAM TOPICAL at 17:10

## 2021-03-10 RX ADMIN — Medication 25 MILLIGRAM(S): at 05:14

## 2021-03-10 RX ADMIN — AMLODIPINE BESYLATE 5 MILLIGRAM(S): 2.5 TABLET ORAL at 05:14

## 2021-03-10 RX ADMIN — APIXABAN 5 MILLIGRAM(S): 2.5 TABLET, FILM COATED ORAL at 05:14

## 2021-03-10 RX ADMIN — APIXABAN 5 MILLIGRAM(S): 2.5 TABLET, FILM COATED ORAL at 17:03

## 2021-03-10 RX ADMIN — Medication 25 MILLIGRAM(S): at 17:03

## 2021-03-10 RX ADMIN — LIDOCAINE 1 PATCH: 4 CREAM TOPICAL at 07:00

## 2021-03-10 RX ADMIN — LIDOCAINE 1 PATCH: 4 CREAM TOPICAL at 05:14

## 2021-03-10 NOTE — CONSULT NOTE ADULT - SUBJECTIVE AND OBJECTIVE BOX
Neurology consult    MAC GUTIERREZyFemale     Patient is a 88y old  Female who presents with a chief complaint of AMS, weakness, elevated trop (09 Mar 2021 02:05)      HPI:    " 87 y/o female w/ PMHx of HTN, recently diagnosed w/ Dementia (started on Aricept, referred to Neuro) and Atrial Fibrillation by PCP 3 weeks ago (not started on anymedications, referred to Cardio) presents to the ED w/ worsening AMS. Pt minimally verbal, moans at times, lethargic, hx obtained from daughter Kim. Daughter reports in the last 3-4 days pt has been "delusional", seeing things that are not there, tangential during conversation, less talkative, staying in bed, NO PO intake, +ve urinary and bowel incontinence. Daughter reports 3 days ago pt slid off the chair to her buttock, no head trauma or LOC, no other trauma to other joints. Today daughter noted pt c/o left shoulder and arm pain, daughter denies any trauma to shoulder (pt watched almost ATC between her and her son). Pt also c/o back pain however daughter states that is chronic for years, unchanged from baseline as far as she knows. Per daughter no other complaints; no cp, fever or chills, n/v, abd pain or diarrhea.    In ED initial vitals /95, , rest of vitals stable. Labs sig for Dimer 1877, K 3.4, Trop 0.053, BNP 1020, CT head, C spine, CT A chest LE doppler, neg for acute pathology  (06 Mar 2021 02:00)"          MEDICATIONS    acetaminophen   Tablet .. 650 milliGRAM(s) Oral every 6 hours PRN  amLODIPine   Tablet 5 milliGRAM(s) Oral daily  apixaban 5 milliGRAM(s) Oral two times a day  ketorolac   Injectable 15 milliGRAM(s) IV Push every 8 hours PRN  lidocaine   Patch 1 Patch Transdermal every 24 hours  losartan 100 milliGRAM(s) Oral daily  metoprolol tartrate 25 milliGRAM(s) Oral two times a day  ondansetron Injectable 4 milliGRAM(s) IV Push every 6 hours PRN  traMADol 50 milliGRAM(s) Oral every 6 hours PRN      PMH: Atrial fibrillation    Dementia    HTN (hypertension)         PSH: Surgical history unknown        FAMILY HISTORY:  No pertinent family history in first degree relatives        SOCIAL HISTORY:  No history of tobacco or alcohol use     Allergies    No Known Allergies    Intolerances            Vital Signs Last 24 Hrs  T(C): 36.4 (10 Mar 2021 05:07), Max: 36.6 (09 Mar 2021 23:30)  T(F): 97.6 (10 Mar 2021 05:07), Max: 97.8 (09 Mar 2021 23:30)  HR: 98 (10 Mar 2021 05:07) (80 - 98)  BP: 146/83 (10 Mar 2021 05:07) (138/83 - 149/93)  BP(mean): --  RR: 17 (10 Mar 2021 05:07) (17 - 18)  SpO2: 99% (10 Mar 2021 05:07) (98% - 99%)      On Neurological Examination:    Mental Status - Patient is alert, awake, oriented X2. intermittently follows some simple commands + grasp, + snout    Cranial Nerves - PERRL, EOMI, VFF, V1-V3 intact, no gross facial asymmetry, tongue/uvula midline    Motor Exam -   gen weakness 3/5 througout poor effort     nml bulk/tone    Sensory    Intact to light touch and pinprick bilaterally    Coord: grossly intact    Gait -  deferred                  LABS:  CBC Full  -  ( 09 Mar 2021 08:27 )  WBC Count : 9.79 K/uL  RBC Count : 5.11 M/uL  Hemoglobin : 14.4 g/dL  Hematocrit : 45.7 %  Platelet Count - Automated : 247 K/uL  Mean Cell Volume : 89.4 fl  Mean Cell Hemoglobin : 28.2 pg  Mean Cell Hemoglobin Concentration : 31.5 gm/dL  Auto Neutrophil # : x  Auto Lymphocyte # : x  Auto Monocyte # : x  Auto Eosinophil # : x  Auto Basophil # : x  Auto Neutrophil % : x  Auto Lymphocyte % : x  Auto Monocyte % : x  Auto Eosinophil % : x  Auto Basophil % : x      03-10    153<H>  |  119<H>  |  38<H>  ----------------------------<  130<H>  3.9   |  30  |  0.79    Ca    9.2      10 Mar 2021 08:38  Mg     1.8     03-09        Hemoglobin A1C:             RADIOLOGY  < from: MR Head No Cont (03.08.21 @ 16:25) >  FINDINGS:    Some sequences are degraded by motion.    Chronic right thalamic lacunar infarct, with susceptibility artifact suggesting chronic hemorrhage. A few additional punctate foci of susceptibility artifact in the right basal ganglia and pedro suggesting chronic microhemorrhages. There is no evidence of acute infarct. Scattered foci of T2/FLAIR hyperintensity in the bilateral hemispheric white matter are nonspecific but likely related to chronic white matter microvascular ischemic disease. There is cerebral volume loss.    Flow voids of the major intracranial vessels at the skull base follow expected course and contour.    The paranasal sinuses demonstrate no signal abnormality. The mastoids demonstrate no signal abnormality.  Bilateral orbits are within normal limits.      IMPRESSION: No acute infarct, hemorrhage, or mass effect.            RASHAD MADDOX MD; Attending Radiologist  This document has been electronically signed. Mar  8 2021  4:58PM    < end of copied text >  < from: CT Lumbar Spine No Cont (03.06.21 @ 12:02) >  IMPRESSION: Degenerative change involving the lumbar spine. No acute fractures or dislocations are seen.    If symptoms continue MRI can be done for further evaluation if there are no contra indications.            FRENCH MATHIS M.D., ATTENDING RADIOLOGIST  This document has been electronically signed. Mar  6 2021  1:24PM    < end of copied text >  < from: CT Cervical Spine No Cont (03.05.21 @ 22:53) >  IMPRESSION:  Head CT: No acute intracranial hemorrhage, vasogenic edema, extra-axial collection or hydrocephalus. Mild chronic microvascular change.    Cervical spine CT: No acute cervical spine fracture or evidence of traumatic malalignment. Cervical spondylosis most notable at C4/C5 where it is likely severe canal and foraminal stenosis.    < end of copied text >  :

## 2021-03-10 NOTE — PROGRESS NOTE ADULT - SUBJECTIVE AND OBJECTIVE BOX
Patient is a 88y old  Female who presents with a chief complaint of AMS, weakness, elevated trop (07 Mar 2021 09:53)      INTERVAL HPI/OVERNIGHT EVENTS: Pt seen this morning, no changes, still confused.   Vitals stable     MEDICATIONS  (STANDING):  amLODIPine   Tablet 5 milliGRAM(s) Oral daily  donepezil 10 milliGRAM(s) Oral at bedtime  enoxaparin Injectable 60 milliGRAM(s) SubCutaneous two times a day  lidocaine   Patch 1 Patch Transdermal every 24 hours  losartan 100 milliGRAM(s) Oral daily  metoprolol tartrate 25 milliGRAM(s) Oral two times a day    MEDICATIONS  (PRN):  acetaminophen   Tablet .. 650 milliGRAM(s) Oral every 6 hours PRN Mild Pain (1 - 3)  ketorolac   Injectable 15 milliGRAM(s) IV Push every 8 hours PRN Moderate Pain (4 - 6)  ondansetron Injectable 4 milliGRAM(s) IV Push every 6 hours PRN Nausea and/or Vomiting  traMADol 50 milliGRAM(s) Oral every 6 hours PRN Moderate Pain (4 - 6)      Allergies    No Known Allergies    Intolerances        REVIEW OF SYSTEMS:  unable to obtain due to ams    ICU Vital Signs Last 24 Hrs  T(C): 37.2 (10 Mar 2021 15:17), Max: 37.2 (10 Mar 2021 15:17)  T(F): 99 (10 Mar 2021 15:17), Max: 99 (10 Mar 2021 15:17)  HR: 92 (10 Mar 2021 15:17) (80 - 98)  BP: 130/83 (10 Mar 2021 15:17) (109/79 - 149/93)  BP(mean): --  ABP: --  ABP(mean): --  RR: 18 (10 Mar 2021 15:17) (17 - 18)  SpO2: 97% (10 Mar 2021 15:17) (97% - 99%)      PHYSICAL EXAM:  GENERAL: NAD, well-groomed, well-developed  HEAD:  Atraumatic, Normocephalic  EYES: EOMI, PERRLA, conjunctiva and sclera clear  ENMT: No tonsillar erythema, exudates, or enlargement; Moist mucous membranes,   NECK: Supple, No JVD, Normal thyroid  NERVOUS SYSTEM:  Alert & Oriented X1 (name), unable to follow commands  CHEST/LUNG: Clear to percussion bilaterally; No rales, rhonchi, wheezing, or rubs  HEART: Regular rate and rhythm; No murmurs, rubs, or gallops  ABDOMEN: Soft, Nontender, Nondistended; Bowel sounds present  EXTREMITIES:  2+ Peripheral Pulses, No clubbing, cyanosis, or edema  LYMPH: No lymphadenopathy noted  SKIN: No rashes or lesions    LABS:                                          14.4   9.79  )-----------( 247      ( 09 Mar 2021 08:27 )             45.7     03-10    153<H>  |  119<H>  |  38<H>  ----------------------------<  130<H>  3.9   |  30  |  0.79    Ca    9.2      10 Mar 2021 08:38  Mg     1.8     03-09               Patient is a 88y old  Female who presents with a chief complaint of AMS, weakness, elevated trop (07 Mar 2021 09:53)      INTERVAL HPI/OVERNIGHT EVENTS: Pt seen this morning, no changes, still confused.  Seen in afternoon- patient much better- speaking in full sentences. Still confused, unsure she's in the hospital however able to communicate her needs.   Vitals stable     MEDICATIONS  (STANDING):  amLODIPine   Tablet 5 milliGRAM(s) Oral daily  donepezil 10 milliGRAM(s) Oral at bedtime  enoxaparin Injectable 60 milliGRAM(s) SubCutaneous two times a day  lidocaine   Patch 1 Patch Transdermal every 24 hours  losartan 100 milliGRAM(s) Oral daily  metoprolol tartrate 25 milliGRAM(s) Oral two times a day    MEDICATIONS  (PRN):  acetaminophen   Tablet .. 650 milliGRAM(s) Oral every 6 hours PRN Mild Pain (1 - 3)  ketorolac   Injectable 15 milliGRAM(s) IV Push every 8 hours PRN Moderate Pain (4 - 6)  ondansetron Injectable 4 milliGRAM(s) IV Push every 6 hours PRN Nausea and/or Vomiting  traMADol 50 milliGRAM(s) Oral every 6 hours PRN Moderate Pain (4 - 6)      Allergies    No Known Allergies    Intolerances        REVIEW OF SYSTEMS:  unable to obtain due to ams    ICU Vital Signs Last 24 Hrs  T(C): 37.2 (10 Mar 2021 15:17), Max: 37.2 (10 Mar 2021 15:17)  T(F): 99 (10 Mar 2021 15:17), Max: 99 (10 Mar 2021 15:17)  HR: 92 (10 Mar 2021 15:17) (80 - 98)  BP: 130/83 (10 Mar 2021 15:17) (109/79 - 149/93)  BP(mean): --  ABP: --  ABP(mean): --  RR: 18 (10 Mar 2021 15:17) (17 - 18)  SpO2: 97% (10 Mar 2021 15:17) (97% - 99%)      PHYSICAL EXAM:  GENERAL: NAD, well-groomed, well-developed  HEAD:  Atraumatic, Normocephalic  EYES: EOMI, PERRLA, conjunctiva and sclera clear  ENMT: No tonsillar erythema, exudates, or enlargement; Moist mucous membranes,   NECK: Supple, No JVD, Normal thyroid  NERVOUS SYSTEM:  Alert & Oriented X1 (name), unable to follow commands  CHEST/LUNG: Clear to percussion bilaterally; No rales, rhonchi, wheezing, or rubs  HEART: Regular rate and rhythm; No murmurs, rubs, or gallops  ABDOMEN: Soft, Nontender, Nondistended; Bowel sounds present  EXTREMITIES:  2+ Peripheral Pulses, No clubbing, cyanosis, or edema  LYMPH: No lymphadenopathy noted  SKIN: No rashes or lesions    LABS:                                          14.4   9.79  )-----------( 247      ( 09 Mar 2021 08:27 )             45.7     03-10    153<H>  |  119<H>  |  38<H>  ----------------------------<  130<H>  3.9   |  30  |  0.79    Ca    9.2      10 Mar 2021 08:38  Mg     1.8     03-09

## 2021-03-10 NOTE — PROGRESS NOTE ADULT - ASSESSMENT
87 y/o female w/ PMHx of HTN, recently diagnosed w/ Dementia- started on Aricept  (started on Aricept, referred to Neuro) and Atrial Fibrillation by PCP 3 weeks ago (not started on any medications, referred to Cardio) presents to the ED w/ worsening AMS.     AMS  - CT head neg  - CT chest neg  - COVID neg  -MRI of brain - No acute infarct, hemorrhage, or mass effect.  -Metabolic Encephalopathy -less likely from UTI  -Aricept d/c due to potential side effects- no changes- may re-start on discharge  -continue hydration        Atrial fibrillation, unspecified type  - started on low dose BB, increased today to 25mg BID due to elevated BP  - on lovenox   - monitor on tele  - f/u TSH-elevated, will get T3/T4  - 2D Echo pending-     Acute pain of left shoulder.  Plan: - ? dislocation  - per Ortho does not appear dislocated on Xray, possible old hematoma, f/u official consult  - pain control pt not complaining of pain today     Elevated troponin.  Plan: - likely demand due to Afib  - cont to trend till peak  - low dose ASA  - f/u Echo  - BB.     Hypokalemia-continue to monitor  Hypernatremia- fluids d/elizabeth still elevated -prob from not eating well, will obtain Nephrology consult      Incontinence of feces, unspecified fecal incontinence type. Plan: - also of urine, new per daughter, unsure if due to weakness  - Neuro exam limited  - f/u CT L spine-Degenerative change involving the lumbar spine. No acute fractures or dislocations are seen    Dysphagia-passed       .    Dementia without behavioral disturbance, unspecified dementia type.  will d/c Aricept for now- could be from side effects  Neurology recs appreciated      DVT prophylaxis. Plan; - on Eliquis    Daughter Kim whom pt lives w/ and is also HCP, pt DNR/DNI    COVID test    Plan to go to Rehab     87 y/o female w/ PMHx of HTN, recently diagnosed w/ Dementia- started on Aricept  (started on Aricept, referred to Neuro) and Atrial Fibrillation by PCP 3 weeks ago (not started on any medications, referred to Cardio) presents to the ED w/ worsening AMS.     AMS  - CT head neg  - CT chest neg  - COVID neg  -MRI of brain - No acute infarct, hemorrhage, or mass effect.  -Metabolic Encephalopathy -less likely from UTI  -Aricept d/c due to potential side effects- no changes- may re-start on discharge  -continue hydration        Atrial fibrillation, unspecified type  - started on low dose BB, increased today to 25mg BID due to elevated BP  - on lovenox   - monitor on tele  - f/u TSH-elevated, will get T3/T4  - 2D Echo pending-Summary: reviewed - Calcified aortic valve leaflets with decreased opening. Mild-moderate aortic stenosis -Most likely age related     Acute pain of left shoulder.  Plan: - ? dislocation  - per Ortho does not appear dislocated on Xray, possible old hematoma, f/u official consult  - pain control pt not complaining of pain today     Elevated troponin.  Plan: - likely demand due to Afib  - cont to trend till peak  - low dose ASA  - f/u Echo  - BB.     Hypokalemia-continue to monitor  Hypernatremia- fluids d/elizabeth still elevated -prob from not eating well, will obtain Nephrology consult      Incontinence of feces, unspecified fecal incontinence type. Plan: - also of urine, new per daughter, unsure if due to weakness  - Neuro exam limited  - f/u CT L spine-Degenerative change involving the lumbar spine. No acute fractures or dislocations are seen    Dysphagia-passed       .    Dementia without behavioral disturbance, unspecified dementia type.  will d/c Aricept for now- could be from side effects  Neurology recs appreciated      DVT prophylaxis. Plan; - on Eliquis    Daughter Kim whom pt lives w/ and is also HCP, pt DNR/DNI- updated daughter about Mom's prognosis.     COVID test repeat    Plan to go to Rehab     87 y/o female w/ PMHx of HTN, recently diagnosed w/ Dementia- started on Aricept  (started on Aricept, referred to Neuro) and Atrial Fibrillation by PCP 3 weeks ago (not started on any medications, referred to Cardio) presents to the ED w/ worsening AMS.     AMS  - CT head neg  - CT chest neg  - COVID neg  -MRI of brain - No acute infarct, hemorrhage, or mass effect.  -Metabolic Encephalopathy -less likely from UTI- no wbc, no fevers, most likely colonized s/p ceftriaxone few day  -Aricept d/c due to potential side effects- no changes- may re-start on discharge  -continue hydration        Atrial fibrillation, unspecified type  - started on low dose BB, increased today to 25mg BID due to elevated BP  - on lovenox   - monitor on tele  - f/u TSH-elevated, will get T3/T4  - 2D Echo pending-Summary: reviewed - Calcified aortic valve leaflets with decreased opening. Mild-moderate aortic stenosis -Most likely age related     Acute pain of left shoulder.  Plan: - ? dislocation  - per Ortho does not appear dislocated on Xray, possible old hematoma, f/u official consult  - pain control pt not complaining of pain today     Elevated troponin.  Plan: - likely demand due to Afib  - cont to trend till peak  - low dose ASA  - f/u Echo  - BB.     Hypokalemia-continue to monitor  Hypernatremia- fluids d/elizabeth still elevated -eating well as per nursing, will obtain Nephrology consult      Incontinence of feces, unspecified fecal incontinence type. Plan: - also of urine, new per daughter, unsure if due to weakness  - Neuro exam limited  - f/u CT L spine-Degenerative change involving the lumbar spine. No acute fractures or dislocations are seen    Dysphagia-passed       .    Dementia without behavioral disturbance, unspecified dementia type.  will d/c Aricept for now- could be from side effects  Neurology recs appreciated      DVT prophylaxis. Plan; - on Eliquis    Daughter Kim whom pt lives w/ and is also HCP, pt DNR/DNI- updated daughter about Mom's prognosis.     COVID test repeat    Plan to go to Rehab

## 2021-03-10 NOTE — CONSULT NOTE ADULT - ASSESSMENT
87 y/o female w/ PMHx of HTN, recently diagnosed w/ Dementia here with worsening MS. Pe suggestive of Dementia with generalized weakness. MRi brain negative. reviewed CT C and LS spine, changes as above. Patient would not be a surgical candidate.    -Aricept held by team as per family she worsened after  -supportive care  -PT/OT for deconditioning  -treat any underlying medical issues per primary team  -No further inpatient Neurologic workup at this time

## 2021-03-11 LAB
ANION GAP SERPL CALC-SCNC: 6 MMOL/L — SIGNIFICANT CHANGE UP (ref 5–17)
BUN SERPL-MCNC: 27 MG/DL — HIGH (ref 7–23)
CALCIUM SERPL-MCNC: 9.1 MG/DL — SIGNIFICANT CHANGE UP (ref 8.5–10.1)
CHLORIDE SERPL-SCNC: 114 MMOL/L — HIGH (ref 96–108)
CO2 SERPL-SCNC: 28 MMOL/L — SIGNIFICANT CHANGE UP (ref 22–31)
CREAT SERPL-MCNC: 0.65 MG/DL — SIGNIFICANT CHANGE UP (ref 0.5–1.3)
CULTURE RESULTS: SIGNIFICANT CHANGE UP
CULTURE RESULTS: SIGNIFICANT CHANGE UP
GLUCOSE SERPL-MCNC: 111 MG/DL — HIGH (ref 70–99)
HCT VFR BLD CALC: 44.9 % — SIGNIFICANT CHANGE UP (ref 34.5–45)
HGB BLD-MCNC: 14.2 G/DL — SIGNIFICANT CHANGE UP (ref 11.5–15.5)
MAGNESIUM SERPL-MCNC: 1.7 MG/DL — SIGNIFICANT CHANGE UP (ref 1.6–2.6)
MCHC RBC-ENTMCNC: 28 PG — SIGNIFICANT CHANGE UP (ref 27–34)
MCHC RBC-ENTMCNC: 31.6 GM/DL — LOW (ref 32–36)
MCV RBC AUTO: 88.4 FL — SIGNIFICANT CHANGE UP (ref 80–100)
NRBC # BLD: 0 /100 WBCS — SIGNIFICANT CHANGE UP (ref 0–0)
PLATELET # BLD AUTO: 248 K/UL — SIGNIFICANT CHANGE UP (ref 150–400)
POTASSIUM SERPL-MCNC: 3.7 MMOL/L — SIGNIFICANT CHANGE UP (ref 3.5–5.3)
POTASSIUM SERPL-SCNC: 3.7 MMOL/L — SIGNIFICANT CHANGE UP (ref 3.5–5.3)
RBC # BLD: 5.08 M/UL — SIGNIFICANT CHANGE UP (ref 3.8–5.2)
RBC # FLD: 16.8 % — HIGH (ref 10.3–14.5)
SODIUM SERPL-SCNC: 148 MMOL/L — HIGH (ref 135–145)
SPECIMEN SOURCE: SIGNIFICANT CHANGE UP
SPECIMEN SOURCE: SIGNIFICANT CHANGE UP
T3 SERPL-MCNC: 63 NG/DL — LOW (ref 80–200)
T4 AB SER-ACNC: 5.6 UG/DL — SIGNIFICANT CHANGE UP (ref 4.6–12)
WBC # BLD: 6.73 K/UL — SIGNIFICANT CHANGE UP (ref 3.8–10.5)
WBC # FLD AUTO: 6.73 K/UL — SIGNIFICANT CHANGE UP (ref 3.8–10.5)

## 2021-03-11 PROCEDURE — 99239 HOSP IP/OBS DSCHRG MGMT >30: CPT

## 2021-03-11 RX ORDER — CANDESARTAN CILEXETIL 8 MG/1
1 TABLET ORAL
Qty: 0 | Refills: 0 | DISCHARGE

## 2021-03-11 RX ORDER — LOSARTAN POTASSIUM 100 MG/1
1 TABLET, FILM COATED ORAL
Qty: 0 | Refills: 0 | DISCHARGE
Start: 2021-03-11

## 2021-03-11 RX ORDER — LIDOCAINE 4 G/100G
1 CREAM TOPICAL
Qty: 0 | Refills: 0 | DISCHARGE
Start: 2021-03-11

## 2021-03-11 RX ORDER — ACETAMINOPHEN 500 MG
2 TABLET ORAL
Qty: 0 | Refills: 0 | DISCHARGE
Start: 2021-03-11

## 2021-03-11 RX ORDER — APIXABAN 2.5 MG/1
1 TABLET, FILM COATED ORAL
Qty: 0 | Refills: 0 | DISCHARGE
Start: 2021-03-11

## 2021-03-11 RX ORDER — METOPROLOL TARTRATE 50 MG
1 TABLET ORAL
Qty: 0 | Refills: 0 | DISCHARGE
Start: 2021-03-11

## 2021-03-11 RX ORDER — TRAMADOL HYDROCHLORIDE 50 MG/1
1 TABLET ORAL
Qty: 0 | Refills: 0 | DISCHARGE
Start: 2021-03-11

## 2021-03-11 RX ORDER — DONEPEZIL HYDROCHLORIDE 10 MG/1
1 TABLET, FILM COATED ORAL
Qty: 0 | Refills: 0 | DISCHARGE

## 2021-03-11 RX ORDER — AMLODIPINE BESYLATE 2.5 MG/1
1 TABLET ORAL
Qty: 0 | Refills: 0 | DISCHARGE
Start: 2021-03-11

## 2021-03-11 RX ADMIN — Medication 25 MILLIGRAM(S): at 05:20

## 2021-03-11 RX ADMIN — LIDOCAINE 1 PATCH: 4 CREAM TOPICAL at 07:41

## 2021-03-11 RX ADMIN — LIDOCAINE 1 PATCH: 4 CREAM TOPICAL at 05:20

## 2021-03-11 RX ADMIN — LOSARTAN POTASSIUM 100 MILLIGRAM(S): 100 TABLET, FILM COATED ORAL at 05:19

## 2021-03-11 RX ADMIN — AMLODIPINE BESYLATE 5 MILLIGRAM(S): 2.5 TABLET ORAL at 05:19

## 2021-03-11 RX ADMIN — APIXABAN 5 MILLIGRAM(S): 2.5 TABLET, FILM COATED ORAL at 05:19

## 2021-03-11 NOTE — DISCHARGE NOTE NURSING/CASE MANAGEMENT/SOCIAL WORK - PATIENT PORTAL LINK FT
You can access the FollowMyHealth Patient Portal offered by [facility] by registering at the following website: [email or web address]. By joining [facility]’s FollowMyHealth portal, you will also be able to view your health information using other applications (apps) compatible with our system.

## 2021-03-11 NOTE — DISCHARGE NOTE PROVIDER - NSDCMRMEDTOKEN_GEN_ALL_CORE_FT
acetaminophen 325 mg oral tablet: 2 tab(s) orally every 6 hours, As needed, Mild Pain (1 - 3)  amLODIPine 5 mg oral tablet: 1 tab(s) orally once a day  apixaban 5 mg oral tablet: 1 tab(s) orally 2 times a day  lidocaine 5% topical film: Apply topically to affected area once a day  losartan 100 mg oral tablet: 1 tab(s) orally once a day  metoprolol tartrate 25 mg oral tablet: 1 tab(s) orally 2 times a day  traMADol 50 mg oral tablet: 1 tab(s) orally every 6 hours, As needed, Moderate Pain (4 - 6)

## 2021-03-11 NOTE — DISCHARGE NOTE PROVIDER - CARE PROVIDER_API CALL
Lanre Fagan (DO)  Orthopaedic Surgery  [address] [place] [address]  Phone: [phone]  Fax: [phone]  Follow Up Time: 1 week    Ryan Disla)  Neurology  [address] [facility], [address]  [place] [address]  Phone: [phone]  Fax: [phone]  Follow Up Time: 1 week    Nathen Meadows  NEPHROLOGY  [address] [facility], [address]  [place], [place] [id number]  Phone: [phone]  Fax: [phone]  Follow Up Time: 1 week    Please follow with your PCP and Cardiologist,   Phone: (   )    -  Fax: (   )    -  Follow Up Time:

## 2021-03-11 NOTE — DISCHARGE NOTE PROVIDER - PROVIDER TOKENS
PROVIDER:[TOKEN:[[id number]],FOLLOWUP:[1 week]],PROVIDER:[TOKEN:[[id number]],FOLLOWUP:[1 week]],PROVIDER:[TOKEN:[[id number]],FOLLOWUP:[1 week]],FREE:[LAST:[Please follow with your PCP and Cardiologist],PHONE:[(   )    -],FAX:[(   )    -]]

## 2021-03-11 NOTE — DISCHARGE NOTE PROVIDER - HOSPITAL COURSE
89 y/o female w/ PMHx of HTN, recently diagnosed w/ Dementia- started on Aricept  (started on Aricept, referred to Neuro) and Atrial Fibrillation by PCP 3 weeks ago (not started on any medications, referred to Cardio) presents to the ED w/ worsening AMS.     AMS  - CT head neg  - CT chest neg  - COVID neg  -MRI of brain - No acute infarct, hemorrhage, or mass effect.  -Metabolic Encephalopathy -less likely from UTI- no wbc, no fevers, most likely colonized s/p ceftriaxone few day  -Aricept d/c due to potential side effects- no changes- (spoke with daughter-does not want Mom being on Aricept for now-because also believes worsen her symptoms instead of improving) Re-evaluate outpatient for need to re-start.        Atrial fibrillation, unspecified type  - started on low dose BB, increased today to 25mg BID due to elevated BP  - continue Eliquis   - monitor on tele  - f/u TSH-elevated-4.9, T3/T4 pending- follow up outpatient   - 2D Echo pending-Summary: reviewed - Calcified aortic valve leaflets with decreased opening. Mild-moderate aortic stenosis -Most likely age related     Acute pain of left shoulder.  Plan: - ? dislocation  - per Ortho does not appear dislocated on Xray, no infection,  possible old hematoma, f/u outpatient with   Orthopedics in 1-2 weeks  - pain control pt not complaining of pain today     Elevated troponin.  Plan: - likely demand due to Afib  - cont to trend till peak  - low dose ASA  - f/u Echo  - BB.     Hypokalemia-continue to monitor  Hypernatremia- spoke with Nephrology- patient needs to drink more. Decreased today     Incontinence of feces, unspecified fecal incontinence type.   - Neuro exam limited  - f/u CT L spine-Degenerative change involving the lumbar spine. No acute fractures or dislocations are seen      Dementia without behavioral disturbance, unspecified dementia type.  will d/c Aricept for now- could be from side effects  Neurology following- continue to monitor outpatient  Repeat TSH/T3/T4  Continue PT/OT     DVT prophylaxis. Plan; - on Eliquis

## 2021-03-11 NOTE — PROGRESS NOTE ADULT - REASON FOR ADMISSION
AMS, weakness, elevated trop

## 2021-03-11 NOTE — CONSULT NOTE ADULT - SUBJECTIVE AND OBJECTIVE BOX
HPI;  Patient is a 88y old  Female who presents with a chief complaint of AMS, weakness, elevated trop. In addition, noted elevated serum Na of 153. According to nurse aide, pt tolerates fluids if/when encouraged.   She is presently mildly lethargic but in no otherwise distress.   Renal evaluation requested to address hyperNa.           PAST MEDICAL & SURGICAL HISTORY:  Atrial fibrillation    Dementia    HTN (hypertension)    Surgical history unknown        FAMILY HISTORY:  No pertinent family history in first degree relatives        Allergies    No Known Allergies          MEDICATIONS  (STANDING):  amLODIPine   Tablet 5 milliGRAM(s) Oral daily  apixaban 5 milliGRAM(s) Oral two times a day  lidocaine   Patch 1 Patch Transdermal every 24 hours  losartan 100 milliGRAM(s) Oral daily  metoprolol tartrate 25 milliGRAM(s) Oral two times a day    MEDICATIONS  (PRN):  acetaminophen   Tablet .. 650 milliGRAM(s) Oral every 6 hours PRN Mild Pain (1 - 3)  ketorolac   Injectable 15 milliGRAM(s) IV Push every 8 hours PRN Moderate Pain (4 - 6)  ondansetron Injectable 4 milliGRAM(s) IV Push every 6 hours PRN Nausea and/or Vomiting  traMADol 50 milliGRAM(s) Oral every 6 hours PRN Moderate Pain (4 - 6)      Daily     Daily     Drug Dosing Weight  Height (cm): 170.2 (05 Mar 2021 20:57)  Weight (kg): 64.4 (06 Mar 2021 03:43)  BMI (kg/m2): 22.2 (06 Mar 2021 03:43)  BSA (m2): 1.75 (06 Mar 2021 03:43)      REVIEW OF SYSTEMS:    Per HPI            I&O's Detail    10 Mar 2021 07:01  -  11 Mar 2021 07:00  --------------------------------------------------------  IN:  Total IN: 0 mL    OUT:    Voided (mL): 200 mL  Total OUT: 200 mL    Total NET: -200 mL          03-10 @ 07:01  -  03-11 @ 07:00  --------------------------------------------------------  IN: 0 mL / OUT: 200 mL / NET: -200 mL        PHYSICAL EXAM:      ENMT: dry mucous membranes.   NECK: Supple. No increase in JVP  CHEST/LUNG: Clear to auscultation bilaterally  HEART: Regular rate and rhythm. No murmurs, rubs, or gallops  ABDOMEN: Soft, Nontender, Nondistended. POS BS  EXTREMITIES:  no edema    LABS:  CBC Full  -  ( 11 Mar 2021 07:55 )  WBC Count : 6.73 K/uL  RBC Count : 5.08 M/uL  Hemoglobin : 14.2 g/dL  Hematocrit : 44.9 %  Platelet Count - Automated : 248 K/uL  Mean Cell Volume : 88.4 fl  Mean Cell Hemoglobin : 28.0 pg  Mean Cell Hemoglobin Concentration : 31.6 gm/dL  Auto Neutrophil # : x  Auto Lymphocyte # : x  Auto Monocyte # : x  Auto Eosinophil # : x  Auto Basophil # : x  Auto Neutrophil % : x  Auto Lymphocyte % : x  Auto Monocyte % : x  Auto Eosinophil % : x  Auto Basophil % : x    03-11    148<H>  |  114<H>  |  27<H>  ----------------------------<  111<H>  3.7   |  28  |  0.65    Ca    9.1      11 Mar 2021 07:55  Mg     1.7     03-11        Impression:  * HyperNa -- poor oral fluid intake  * Delirium, dementia  * A.fib, elevated Trop    Recommendations:   * Encourage oral fluids as tolerates. This was discussed with nurse aide  * No objection to dc from renal POV

## 2021-03-11 NOTE — PROGRESS NOTE ADULT - SUBJECTIVE AND OBJECTIVE BOX
Neurology Follow up note    Interval History - Patient seen and examined this am.             Vital Signs Last 24 Hrs  T(C): 37.2 (10 Mar 2021 15:17), Max: 37.2 (10 Mar 2021 15:17)  T(F): 99 (10 Mar 2021 15:17), Max: 99 (10 Mar 2021 15:17)  HR: 92 (10 Mar 2021 15:17) (81 - 95)  BP: 130/83 (10 Mar 2021 15:17) (109/79 - 132/88)  BP(mean): --  RR: 18 (10 Mar 2021 15:17) (18 - 18)  SpO2: 97% (10 Mar 2021 15:17) (97% - 98%)    PHYSICAL EXAM:          Mental Status - Patient is alert, awake, oriented X2. intermittently follows some simple commands + grasp, + snout    Cranial Nerves - PERRL, EOMI, VFF, V1-V3 intact, no gross facial asymmetry, tongue/uvula midline    Motor Exam -   gen weakness 3/5 througout poor effort     nml bulk/tone    Sensory    Intact to light touch and pinprick bilaterally    Coord: grossly intact    Gait -  deferred        Medications  acetaminophen   Tablet .. 650 milliGRAM(s) Oral every 6 hours PRN  amLODIPine   Tablet 5 milliGRAM(s) Oral daily  apixaban 5 milliGRAM(s) Oral two times a day  ketorolac   Injectable 15 milliGRAM(s) IV Push every 8 hours PRN  lidocaine   Patch 1 Patch Transdermal every 24 hours  losartan 100 milliGRAM(s) Oral daily  metoprolol tartrate 25 milliGRAM(s) Oral two times a day  ondansetron Injectable 4 milliGRAM(s) IV Push every 6 hours PRN  traMADol 50 milliGRAM(s) Oral every 6 hours PRN      Lab      Radiology

## 2021-03-11 NOTE — DISCHARGE NOTE PROVIDER - NSDCCPCAREPLAN_GEN_ALL_CORE_FT
PRINCIPAL DISCHARGE DIAGNOSIS  Diagnosis: Delirium  Assessment and Plan of Treatment:       SECONDARY DISCHARGE DIAGNOSES  Diagnosis: Generalized weakness  Assessment and Plan of Treatment:

## 2021-03-17 DIAGNOSIS — M19.012 PRIMARY OSTEOARTHRITIS, LEFT SHOULDER: ICD-10-CM

## 2021-03-17 DIAGNOSIS — I24.8 OTHER FORMS OF ACUTE ISCHEMIC HEART DISEASE: ICD-10-CM

## 2021-03-17 DIAGNOSIS — E87.6 HYPOKALEMIA: ICD-10-CM

## 2021-03-17 DIAGNOSIS — T44.0X5A ADVERSE EFFECT OF ANTICHOLINESTERASE AGENTS, INITIAL ENCOUNTER: ICD-10-CM

## 2021-03-17 DIAGNOSIS — M25.512 PAIN IN LEFT SHOULDER: ICD-10-CM

## 2021-03-17 DIAGNOSIS — Z66 DO NOT RESUSCITATE: ICD-10-CM
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
## 2021-03-17 DIAGNOSIS — R13.10 DYSPHAGIA, UNSPECIFIED: ICD-10-CM

## 2021-03-17 DIAGNOSIS — R53.1 WEAKNESS: ICD-10-CM

## 2021-03-17 DIAGNOSIS — G93.41 METABOLIC ENCEPHALOPATHY: ICD-10-CM

## 2021-03-17 DIAGNOSIS — E87.0 HYPEROSMOLALITY AND HYPERNATREMIA: ICD-10-CM

## 2021-03-17 DIAGNOSIS — I10 ESSENTIAL (PRIMARY) HYPERTENSION: ICD-10-CM

## 2021-03-17 DIAGNOSIS — I48.91 UNSPECIFIED ATRIAL FIBRILLATION: ICD-10-CM

## 2021-03-17 DIAGNOSIS — F03.90 UNSPECIFIED DEMENTIA WITHOUT BEHAVIORAL DISTURBANCE: ICD-10-CM

## 2021-03-17 DIAGNOSIS — R41.0 DISORIENTATION, UNSPECIFIED: ICD-10-CM

## 2021-03-17 DIAGNOSIS — I35.0 NONRHEUMATIC AORTIC (VALVE) STENOSIS: ICD-10-CM

## 2021-03-17 DIAGNOSIS — R15.9 FULL INCONTINENCE OF FECES: ICD-10-CM

## 2021-03-17 DIAGNOSIS — R41.82 ALTERED MENTAL STATUS, UNSPECIFIED: ICD-10-CM

## 2022-08-18 NOTE — ED ADULT TRIAGE NOTE - NSTRIAGECARE_GEN_A_ER
Occupational Therapy  Facility/Department: [address] STEPDOWN  Occupational Therapy Daily Treatment Note    Name: Valeria Olivera  : 1960  MRN: [id number]  Date of Service: 2022    Discharge Recommendations:  Patient would benefit from continued therapy after discharge in order to increase pt balance, strength and independence              Assessment   Performance deficits / Impairments: Decreased functional mobility ; Decreased balance;Decreased ADL status; Decreased endurance;Decreased high-level IADLs;Decreased cognition;Decreased safe awareness  Prognosis: Good  REQUIRES OT FOLLOW-UP: Yes  Activity Tolerance  Activity Tolerance: Patient Tolerated treatment well  Activity Tolerance Comments: hypotension        Plan   Plan  Times per Week: 3-5x/wk  Current Treatment Recommendations: Balance training, Functional mobility training, Endurance training, Safety education & training, Cognitive reorientation, Patient/Caregiver education & training, Equipment evaluation, education, & procurement, Self-Care / ADL, Home management training     Restrictions  Restrictions/Precautions  Restrictions/Precautions: Fall Risk  Required Braces or Orthoses?: No  Position Activity Restriction  Other position/activity restrictions: up w/ assist, 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 Pt supine in bed at start of session and retired to supine in bed at session end. Pt denied pain c/o dizziness with activity            Objective        Safety Devices  Type of Devices: Gait belt;Left in bed;Call light within reach; Bed alarm in place; Patient at risk for falls;Nurse notified  Restraints  Restraints Initially in Place: No  Balance  Sitting: Without support (Pt tolerated approx 30 min static/dynamic sitting unsupported at EOB SBA increasing to CGA with dynamic Face Mask reaching activity utilizing RUE to obtain 8/8 objects from various planes including crossing midline pt demo 1-2 minor L lateral LOB able to self correct. 5/30 min facilitated for RUE WB)  Standing: With support (Max A x2 tolerating approx 30 sec with increased unresponsiveness pt c/o dizziness returned to seated unable to take BP standing)        ADL  Grooming: Modified independent ;Setup  Grooming Skilled Clinical Factors: hair washing, hair brushing and face washing facilitated seated at EOB  Additional Comments: Throughout session pt limited per dizziness. Pt BP monitored throughout session 118/63 supine, 106/72 seated EOB, attempted when standing, unsuccessful however returned to 115/74 seated after approx 3-4 min. Grasp activity facilitated seated at EOB with RUE completing 20 grasps,1 set     Activity Tolerance  Activity Tolerance: Patient limited by endurance; Patient limited by pain;Treatment limited secondary to medical complications  Activity Tolerance Comments: BP decreasing from supine->sitting EOB->standing; pt started to become lightheaded upon sitting on pads on refugio stedy; positioned pt at EOB and then pt became unresponsive. Pt became responsive after about ~15 seconds in supine. Transfers  Sit to stand: Maximum assistance;2 Person assistance  Stand to sit: 2 Person assistance;Maximum assistance  Transfer Comments: utilizing SS     Cognition  Overall Cognitive Status: Exceptions  Arousal/Alertness: Appropriate responses to stimuli  Following Commands: Follows multistep commands with repitition; Follows multistep commands with increased time  Attention Span: Appears intact  Memory: Appears intact  Safety Judgement: Decreased awareness of need for assistance  Problem Solving: Assistance required to identify errors made;Assistance required to correct errors made  Insights: Decreased awareness of deficits  Initiation: Requires cues for some  Sequencing: Requires cues for some  Orientation  Overall Orientation Status: Within Functional Limits  Orientation Level: Oriented to person;Oriented to place; Disoriented to situation;Oriented to time                  Education Given To: Patient  Education Provided: Role of Therapy;Transfer Training  Education Provided Comments: proper hand and foot placement; balance maintaince; RUE activity; RUE ROM-F carry over  Education Method: Demonstration;Verbal  Education Outcome: Continued education needed                                                 AM-PAC Score        AM-PAC Inpatient Daily Activity Raw Score: 17 (08/18/22 1601)  AM-PAC Inpatient ADL T-Scale Score : 37.26 (08/18/22 1601)  ADL Inpatient CMS 0-100% Score: 50.11 (08/18/22 1601)  ADL Inpatient CMS G-Code Modifier : CK (08/18/22 1601)      Goals  Short Term Goals  Time Frame for Short term goals: Before discharge, pt will:  Short Term Goal 1: demo bed mobility with min A to promote increased engagement in ADLs/IADLs  Short Term Goal 2: demo functional transfers with mod A x2 and LRD PRN  Short Term Goal 3: demo 2 minutes of standing tolerance with mod A and LRD PRN  Short Term Goal 4: demo UB ADLs with SBA and use of adaptive techniques  Short Term Goal 5: demo LB ADLs with mod A and use of DME PRN  Short Term Goal 6: follow 80% of two step commands to address cognitive function       Therapy Time   Individual Concurrent Group Co-treatment   Time In 1331         Time Out 1419         Minutes 48         Timed Code Treatment Minutes: 23 Minutes (co tx with PTA)       ZULEMA Foley

## 2022-10-05 PROBLEM — I48.91 UNSPECIFIED ATRIAL FIBRILLATION: Chronic | Status: ACTIVE | Noted: 2021-03-06

## 2022-10-05 PROBLEM — I10 ESSENTIAL (PRIMARY) HYPERTENSION: Chronic | Status: ACTIVE | Noted: 2021-03-05

## 2022-10-05 PROBLEM — F03.90 UNSPECIFIED DEMENTIA WITHOUT BEHAVIORAL DISTURBANCE: Chronic | Status: ACTIVE | Noted: 2021-03-05

## 2022-12-15 ENCOUNTER — APPOINTMENT (OUTPATIENT)
Dept: INTERNAL MEDICINE | Facility: CLINIC | Age: 87
End: 2022-12-15

## 2022-12-15 VITALS
HEART RATE: 88 BPM | SYSTOLIC BLOOD PRESSURE: 154 MMHG | DIASTOLIC BLOOD PRESSURE: 84 MMHG | WEIGHT: 195 LBS | TEMPERATURE: 97 F | OXYGEN SATURATION: 98 % | HEIGHT: 67 IN | BODY MASS INDEX: 30.61 KG/M2

## 2022-12-15 VITALS — DIASTOLIC BLOOD PRESSURE: 85 MMHG | SYSTOLIC BLOOD PRESSURE: 130 MMHG

## 2022-12-15 DIAGNOSIS — R32 UNSPECIFIED URINARY INCONTINENCE: ICD-10-CM

## 2022-12-15 DIAGNOSIS — I10 ESSENTIAL (PRIMARY) HYPERTENSION: ICD-10-CM

## 2022-12-15 DIAGNOSIS — Z13.0 ENCOUNTER FOR SCREENING FOR DISEASES OF THE BLOOD AND BLOOD-FORMING ORGANS AND CERTAIN DISORDERS INVOLVING THE IMMUNE MECHANISM: ICD-10-CM

## 2022-12-15 DIAGNOSIS — M19.90 UNSPECIFIED OSTEOARTHRITIS, UNSPECIFIED SITE: ICD-10-CM

## 2022-12-15 DIAGNOSIS — Z00.00 ENCOUNTER FOR GENERAL ADULT MEDICAL EXAMINATION W/OUT ABNORMAL FINDINGS: ICD-10-CM

## 2022-12-15 DIAGNOSIS — R44.3 HALLUCINATIONS, UNSPECIFIED: ICD-10-CM

## 2022-12-15 DIAGNOSIS — I82.492 ACUTE EMBOLISM AND THROMBOSIS OF OTHER SPECIFIED DEEP VEIN OF LEFT LOWER EXTREMITY: ICD-10-CM

## 2022-12-15 DIAGNOSIS — F03.90 UNSPECIFIED DEMENTIA W/OUT BEHAVIORAL DISTURBANCE: ICD-10-CM

## 2022-12-15 PROCEDURE — G0439: CPT

## 2022-12-15 PROCEDURE — G0444 DEPRESSION SCREEN ANNUAL: CPT

## 2022-12-15 RX ORDER — ACETAMINOPHEN 325 MG/1
325 TABLET ORAL EVERY 6 HOURS
Refills: 0 | Status: ACTIVE | COMMUNITY
Start: 2022-12-15

## 2022-12-15 RX ORDER — LOSARTAN POTASSIUM 100 MG/1
100 TABLET, FILM COATED ORAL
Refills: 0 | Status: ACTIVE | COMMUNITY
Start: 2022-12-15

## 2022-12-15 NOTE — HISTORY OF PRESENT ILLNESS
[Family Member] : family member [FreeTextEntry1] : New patient [de-identified] : Ms. Munir Taylor is a 88 y/o F wheelchair bound, with PMH of dementia, hypertension, osteoarthritis, DVT (left leg), presented to establish new PCP.  Patient is accompanied by her daughter Ms Alvarez who reports patient at times can have hallucinations(talks herself) and has trouble sleeping at times. No other complains were reported at this time. As per daughter patient was seen last month by her pcp. Has a HHA  24/7\par

## 2022-12-15 NOTE — PLAN
[FreeTextEntry1] : Well adult exam is done\par - Planned to do BW and ekg however patient refused. \par - Patient also refused full physical exam. \par  - Reports had last appointment with PCP 1 month ago and had blood work done with him. \par Recommend to bring report of the  blood test, ekg\par Dementia recommend to be evaluated by a neurologist\par For hallucination time to time, recommend to f/u with a psychiatrist, needs to do u/a to r/o uti. Patient do not want to  collect urine as she  wear diapers. If patient will be agitated  and confused recommend  to take her to ER.\par Discussed with the daughter plan\par She is verbalized understanding. Recommend  to rtc in 1 mo with blood test

## 2022-12-15 NOTE — HEALTH RISK ASSESSMENT
[Good] : ~his/her~ current health as good [Fair] :  ~his/her~ mood as fair [Never] : Never [Yes] : Yes [Monthly or less (1 pt)] : Monthly or less (1 point) [1 or 2 (0 pts)] : 1 or 2 (0 points) [No] : In the past 12 months have you used drugs other than those required for medical reasons? No [No falls in past year] : Patient reported no falls in the past year [Patient refused screening] : Patient refused screening [Alone] : lives alone [Retired] : retired [Feels Safe at Home] : Feels safe at home [Fully functional (bathing, dressing, toileting, transferring, walking, feeding)] : Fully functional (bathing, dressing, toileting, transferring, walking, feeding) [Smoke Detector] : smoke detector [Carbon Monoxide Detector] : carbon monoxide detector [Seat Belt] :  uses seat belt [Never (0 pts)] : Never (0 points) [Audit-CScore] : 0 [de-identified] : None [de-identified] : regular [Change in mental status noted] : No change in mental status noted [Reports changes in hearing] : Reports no changes in hearing [Reports changes in vision] : Reports no changes in vision [Reports changes in dental health] : Reports no changes in dental health [Guns at Home] : no guns at home [Sunscreen] : does not use sunscreen [Travel to Developing Areas] : does not  travel to developing areas [TB Exposure] : is not being exposed to tuberculosis [Caregiver Concerns] : does not have caregiver concerns [MammogramComments] : Do not remember [PapSmearComments] : Do not remember [BoneDensityComments] : Do not remember [ColonoscopyComments] : Do not remember [FreeTextEntry4] : Patient and Patient's family encouraged to have conversation about advance care directives.

## 2023-02-22 NOTE — PHYSICAL THERAPY INITIAL EVALUATION ADULT - THERAPY FREQUENCY, PT EVAL
5-7x/week 3-5x/week Carac Counseling:  I discussed with the patient the risks of Carac including but not limited to erythema, scaling, itching, weeping, crusting, and pain.

## 2023-05-31 NOTE — H&P ADULT - PROBLEM/PLAN-2
Detail Level: Detailed
Patient Specific Counseling (Will Not Stick From Patient To Patient): Left cheek biopsy proven LN2
DISPLAY PLAN FREE TEXT

## 2023-08-01 NOTE — ED ADULT NURSE NOTE - HIV OFFER
We will call you to scheduled EUS and ERCP    Continue Creon, 1 tablet with each meal, up to 6 times a day    Will check Vitamin D at next lab draw    Continue pantoprazole    Continue to avoid alcohol    Work on smoking cessation     Opt out